# Patient Record
Sex: FEMALE | Race: WHITE | Employment: UNEMPLOYED | URBAN - METROPOLITAN AREA
[De-identification: names, ages, dates, MRNs, and addresses within clinical notes are randomized per-mention and may not be internally consistent; named-entity substitution may affect disease eponyms.]

---

## 2022-01-06 ENCOUNTER — APPOINTMENT (OUTPATIENT)
Dept: GENERAL RADIOLOGY | Age: 27
End: 2022-01-06
Payer: COMMERCIAL

## 2022-01-06 ENCOUNTER — HOSPITAL ENCOUNTER (EMERGENCY)
Age: 27
Discharge: HOME | End: 2022-01-06
Payer: COMMERCIAL

## 2022-01-06 VITALS
SYSTOLIC BLOOD PRESSURE: 117 MMHG | DIASTOLIC BLOOD PRESSURE: 74 MMHG | OXYGEN SATURATION: 100 % | WEIGHT: 135 LBS | HEART RATE: 102 BPM | TEMPERATURE: 97.1 F | RESPIRATION RATE: 15 BRPM

## 2022-01-06 DIAGNOSIS — R00.0 TACHYCARDIA: Primary | ICD-10-CM

## 2022-01-06 LAB
A/G RATIO: 1.8 (ref 1.1–2.2)
ALBUMIN SERPL-MCNC: 4.6 G/DL (ref 3.4–5)
ALP BLD-CCNC: 37 U/L (ref 40–129)
ALT SERPL-CCNC: 14 U/L (ref 10–40)
ANION GAP SERPL CALCULATED.3IONS-SCNC: 12 MMOL/L (ref 3–16)
AST SERPL-CCNC: 12 U/L (ref 15–37)
BASOPHILS ABSOLUTE: 0.1 K/UL (ref 0–0.2)
BASOPHILS RELATIVE PERCENT: 0.4 %
BILIRUB SERPL-MCNC: 0.3 MG/DL (ref 0–1)
BUN BLDV-MCNC: 8 MG/DL (ref 7–20)
CALCIUM SERPL-MCNC: 8.8 MG/DL (ref 8.3–10.6)
CHLORIDE BLD-SCNC: 102 MMOL/L (ref 99–110)
CO2: 23 MMOL/L (ref 21–32)
CREAT SERPL-MCNC: 0.6 MG/DL (ref 0.6–1.1)
EKG ATRIAL RATE: 108 BPM
EKG ATRIAL RATE: 113 BPM
EKG DIAGNOSIS: NORMAL
EKG DIAGNOSIS: NORMAL
EKG P AXIS: 47 DEGREES
EKG P AXIS: 61 DEGREES
EKG P-R INTERVAL: 142 MS
EKG P-R INTERVAL: 144 MS
EKG Q-T INTERVAL: 324 MS
EKG Q-T INTERVAL: 352 MS
EKG QRS DURATION: 82 MS
EKG QRS DURATION: 82 MS
EKG QTC CALCULATION (BAZETT): 444 MS
EKG QTC CALCULATION (BAZETT): 471 MS
EKG R AXIS: 43 DEGREES
EKG R AXIS: 45 DEGREES
EKG T AXIS: 18 DEGREES
EKG T AXIS: 82 DEGREES
EKG VENTRICULAR RATE: 108 BPM
EKG VENTRICULAR RATE: 113 BPM
EOSINOPHILS ABSOLUTE: 0.1 K/UL (ref 0–0.6)
EOSINOPHILS RELATIVE PERCENT: 0.4 %
GFR AFRICAN AMERICAN: >60
GFR NON-AFRICAN AMERICAN: >60
GLUCOSE BLD-MCNC: 127 MG/DL (ref 70–99)
HCT VFR BLD CALC: 45.3 % (ref 36–48)
HEMOGLOBIN: 15.4 G/DL (ref 12–16)
LYMPHOCYTES ABSOLUTE: 1.3 K/UL (ref 1–5.1)
LYMPHOCYTES RELATIVE PERCENT: 10.7 %
MCH RBC QN AUTO: 30 PG (ref 26–34)
MCHC RBC AUTO-ENTMCNC: 34.1 G/DL (ref 31–36)
MCV RBC AUTO: 87.9 FL (ref 80–100)
MONOCYTES ABSOLUTE: 0.6 K/UL (ref 0–1.3)
MONOCYTES RELATIVE PERCENT: 4.7 %
NEUTROPHILS ABSOLUTE: 10.2 K/UL (ref 1.7–7.7)
NEUTROPHILS RELATIVE PERCENT: 83.8 %
PDW BLD-RTO: 12.6 % (ref 12.4–15.4)
PLATELET # BLD: 258 K/UL (ref 135–450)
PMV BLD AUTO: 8.3 FL (ref 5–10.5)
POTASSIUM REFLEX MAGNESIUM: 3.7 MMOL/L (ref 3.5–5.1)
RBC # BLD: 5.16 M/UL (ref 4–5.2)
SODIUM BLD-SCNC: 137 MMOL/L (ref 136–145)
TOTAL PROTEIN: 7.1 G/DL (ref 6.4–8.2)
TROPONIN: <0.01 NG/ML
TSH REFLEX: 0.35 UIU/ML (ref 0.27–4.2)
WBC # BLD: 12.2 K/UL (ref 4–11)

## 2022-01-06 PROCEDURE — 99284 EMERGENCY DEPT VISIT MOD MDM: CPT

## 2022-01-06 PROCEDURE — 2580000003 HC RX 258: Performed by: PHYSICIAN ASSISTANT

## 2022-01-06 PROCEDURE — 93005 ELECTROCARDIOGRAM TRACING: CPT | Performed by: EMERGENCY MEDICINE

## 2022-01-06 PROCEDURE — 84443 ASSAY THYROID STIM HORMONE: CPT

## 2022-01-06 PROCEDURE — 84484 ASSAY OF TROPONIN QUANT: CPT

## 2022-01-06 PROCEDURE — 6370000000 HC RX 637 (ALT 250 FOR IP): Performed by: PHYSICIAN ASSISTANT

## 2022-01-06 PROCEDURE — 93010 ELECTROCARDIOGRAM REPORT: CPT | Performed by: INTERNAL MEDICINE

## 2022-01-06 PROCEDURE — 85025 COMPLETE CBC W/AUTO DIFF WBC: CPT

## 2022-01-06 PROCEDURE — 80053 COMPREHEN METABOLIC PANEL: CPT

## 2022-01-06 PROCEDURE — 71045 X-RAY EXAM CHEST 1 VIEW: CPT

## 2022-01-06 RX ORDER — 0.9 % SODIUM CHLORIDE 0.9 %
1000 INTRAVENOUS SOLUTION INTRAVENOUS ONCE
Status: COMPLETED | OUTPATIENT
Start: 2022-01-06 | End: 2022-01-06

## 2022-01-06 RX ORDER — HYDROXYZINE PAMOATE 25 MG/1
25 CAPSULE ORAL 3 TIMES DAILY PRN
Qty: 90 CAPSULE | Refills: 0 | Status: SHIPPED | OUTPATIENT
Start: 2022-01-06 | End: 2022-02-05

## 2022-01-06 RX ORDER — LORAZEPAM 1 MG/1
1 TABLET ORAL ONCE
Status: COMPLETED | OUTPATIENT
Start: 2022-01-06 | End: 2022-01-06

## 2022-01-06 RX ADMIN — LORAZEPAM 1 MG: 1 TABLET ORAL at 11:40

## 2022-01-06 RX ADMIN — SODIUM CHLORIDE 1000 ML: 9 INJECTION, SOLUTION INTRAVENOUS at 11:41

## 2022-01-06 ASSESSMENT — PAIN SCALES - GENERAL
PAINLEVEL_OUTOF10: 0
PAINLEVEL_OUTOF10: 0

## 2022-01-06 NOTE — ED NOTES
Xray at [facility], [facility]  01/06/22 4169

## 2022-01-06 NOTE — ED PROVIDER NOTES
I did not personally evaluate this patient but I was asked to review the EKG. EKG  The Ekg interpreted by myself in the emergency department in the absence of a cardiologist.  sinus tachycardia, sdpl=775 with a rate of 113  Axis is   Normal  QTc is  within an acceptable range  Intervals and Durations are unremarkable. No specific ST-T wave changes appreciated. No evidence of acute ischemia.    No prior EKG available for comparison     Raquel Abarca MD  01/06/22 2311

## 2022-01-06 NOTE — ED PROVIDER NOTES
Running Out of Food in the Last Year: Not on file    Ran Out of Food in the Last Year: Not on file   Transportation Needs:     Lack of Transportation (Medical): Not on file    Lack of Transportation (Non-Medical): Not on file   Physical Activity:     Days of Exercise per Week: Not on file    Minutes of Exercise per Session: Not on file   Stress:     Feeling of Stress : Not on file   Social Connections:     Frequency of Communication with Friends and Family: Not on file    Frequency of Social Gatherings with Friends and Family: Not on file    Attends [employer] Services: Not on file    Active Member of [address] [place] [address] [employer] or Organizations: Not on file    Attends Club or Organization Meetings: Not on file    Marital Status: Not on file   Intimate Partner Violence:     Fear of Current or Ex-Partner: Not on file    Emotionally Abused: Not on file    Physically Abused: Not on file    Sexually Abused: Not on file   Housing Stability:     Unable to Pay for Housing in the Last Year: Not on file    Number of [place] in the Last Year: Not on file    Unstable Housing in the Last Year: Not on file     No current facility-administered medications for this encounter. Current Outpatient Medications   Medication Sig Dispense Refill    hydrOXYzine (VISTARIL) 25 MG capsule Take 1 capsule by mouth 3 times daily as needed for Itching 90 capsule 0     No Known Allergies    REVIEW OF SYSTEMS  10 systems reviewed, pertinent positives per HPI otherwise noted to be negative    PHYSICAL EXAM  /74   Pulse 102   Temp 97.1 °F (36.2 °C) (Oral)   Resp 15   Wt 135 lb (61.2 kg)   SpO2 100%   GENERAL APPEARANCE: Awake and alert. Cooperative. Anxious, tearful  HEAD: Normocephalic. Atraumatic. EYES: EOM's grossly intact. ENT: Mucous membranes are moist.   NECK: Supple. HEART: Tachycardic, regular rhythm no murmur. LUNGS: Respirations unlabored. CTAB. Good air exchange. Speaking comfortably in full sentences.    ABDOMEN: Soft. Non-distended. Non-tender. No guarding or rebound. No masses. No organomegaly. EXTREMITIES: No peripheral edema. Moves all extremities equally. All extremities neurovascularly intact. No calf tenderness or unilateral leg swelling. SKIN: Warm and dry. No acute rashes. NEUROLOGICAL: Alert and oriented. CN's 2-12 intact. No gross facial drooping. Strength 5/5, sensation intact. PSYCHIATRIC: Normal mood and affect. RADIOLOGY  XR CHEST PORTABLE   Final Result   No acute process. LABS  Labs Reviewed   CBC WITH AUTO DIFFERENTIAL - Abnormal; Notable for the following components:       Result Value    WBC 12.2 (*)     Neutrophils Absolute 10.2 (*)     All other components within normal limits    Narrative:     Performed at:  [address], [facility]   Phone [phone]   COMPREHENSIVE METABOLIC PANEL W/ REFLEX TO MG FOR LOW K - Abnormal; Notable for the following components:    Glucose 127 (*)     Alkaline Phosphatase 37 (*)     AST 12 (*)     All other components within normal limits    Narrative:     Performed at:  [facility]) [address], [facility]   Phone [phone]   TROPONIN    Narrative:     Performed at:  [address], [facility]   Phone [phone]   TSH WITH REFLEX       PROCEDURES  Unless otherwise noted below, none  Procedures    ED COURSE    ED Course as of 01/06/22 1606   Thu Jan 06, 2022   1312 Reevaluation patient had resolution of her tachycardia and was feeling much better. We will plan discharge patient home with cardiology as well as primary care follow-up. Patient continues to deny chest pain pleuritic chest pain difficulty breathing.  [MM]      ED Course User Index  [MM] Shimon Mcgowan PA-C       CRITICAL CARE TIME  The total critical care time spent while evaluating and treating this patient was 0 minutes. This excludes time spent doing separately billable procedures. This includes time at the bedside, data interpretation, medication management, obtaining critical history from collateral sources if the patient is unable to provide it directly, and physician consultation. Specifics of interventions taken and potentially life-threatening diagnostic considerations are listed above in the medical decision making. MDM  MDM  77-year-old female presents the ED for evaluation of elevated heart rate. Symptoms have been ongoing for the past week. She reports a lot of new stressors in her life including difficulties at home and illnesses within the family. She reports her grandmother is in the ICU as of today. Arrival to ED patient was tachycardic at a rate of 115 and very anxious and tearful in appearance. She admits to being very anxious with home stressors. She had no chest pain, difficulty breathing or pleuritic chest pain. No history of blood clots. No risk factors for pulmonary embolism. Lab work was obtained which demonstrates no electrolyte abnormalities no significant leukocytosis troponin undetected. Please refer to attending note for EKG interpretation. Chest x-ray was clear. Patient was given a liter of fluid as well as a dose of antianxiety medication. On reevaluation she had resolution of her tachycardia as well as improvement of her symptoms. Patient states she is feeling better and does feel comfortable for discharge home. We will plan discharge patient home. I did provide her with a referral for primary care as she requested as well as referral for cardiology for evaluation of the tachycardia. Discussed with her the possible use of monitors in the future such as Holter or event monitors. Confirmed with the patient again that she is not having any chest pain pleuritic chest pain difficulty breathing. She states she is not having any of the symptoms.   We will plan discharge patient home on a course of Vistaril. This time however estimate low risk for ACS or pulmonary embolism. DISPOSITION  Patient was discharged to home in good condition. CLINICAL IMPRESSION  1.  Tachycardia            Roxann Cunningham PA-C  01/06/22 0420

## 2022-01-10 ENCOUNTER — TELEPHONE (OUTPATIENT)
Dept: CARDIOLOGY CLINIC | Age: 27
End: 2022-01-10

## 2022-01-10 ENCOUNTER — HOSPITAL ENCOUNTER (OUTPATIENT)
Age: 27
Discharge: HOME | End: 2022-01-10
Payer: COMMERCIAL

## 2022-01-10 ENCOUNTER — OFFICE VISIT (OUTPATIENT)
Dept: CARDIOLOGY CLINIC | Age: 27
End: 2022-01-10
Payer: COMMERCIAL

## 2022-01-10 VITALS
BODY MASS INDEX: 27 KG/M2 | SYSTOLIC BLOOD PRESSURE: 108 MMHG | OXYGEN SATURATION: 99 % | WEIGHT: 143 LBS | DIASTOLIC BLOOD PRESSURE: 74 MMHG | HEIGHT: 61 IN | HEART RATE: 125 BPM

## 2022-01-10 DIAGNOSIS — R00.0 TACHYCARDIA: ICD-10-CM

## 2022-01-10 DIAGNOSIS — R00.2 PALPITATIONS: ICD-10-CM

## 2022-01-10 LAB
AMPHETAMINE SCREEN, URINE: NORMAL
BARBITURATE SCREEN URINE: NORMAL
BASOPHILS ABSOLUTE: 0 K/UL (ref 0–0.2)
BASOPHILS RELATIVE PERCENT: 0.3 %
BENZODIAZEPINE SCREEN, URINE: NORMAL
CANNABINOID SCREEN URINE: NORMAL
COCAINE METABOLITE SCREEN URINE: NORMAL
D DIMER: 289 NG/ML DDU (ref 0–229)
EOSINOPHILS ABSOLUTE: 0 K/UL (ref 0–0.6)
EOSINOPHILS RELATIVE PERCENT: 0.3 %
GONADOTROPIN, CHORIONIC (HCG) QUANT: <5 MIU/ML
HCT VFR BLD CALC: 47.3 % (ref 36–48)
HEMOGLOBIN: 16.2 G/DL (ref 12–16)
LYMPHOCYTES ABSOLUTE: 1.5 K/UL (ref 1–5.1)
LYMPHOCYTES RELATIVE PERCENT: 11.6 %
Lab: NORMAL
MAGNESIUM: 2 MG/DL (ref 1.8–2.4)
MCH RBC QN AUTO: 30.2 PG (ref 26–34)
MCHC RBC AUTO-ENTMCNC: 34.3 G/DL (ref 31–36)
MCV RBC AUTO: 88.2 FL (ref 80–100)
METHADONE SCREEN, URINE: NORMAL
MONOCYTES ABSOLUTE: 0.5 K/UL (ref 0–1.3)
MONOCYTES RELATIVE PERCENT: 4 %
NEUTROPHILS ABSOLUTE: 10.5 K/UL (ref 1.7–7.7)
NEUTROPHILS RELATIVE PERCENT: 83.8 %
OPIATE SCREEN URINE: NORMAL
OXYCODONE URINE: NORMAL
PDW BLD-RTO: 12.7 % (ref 12.4–15.4)
PH UA: 7
PHENCYCLIDINE SCREEN URINE: NORMAL
PLATELET # BLD: 263 K/UL (ref 135–450)
PMV BLD AUTO: 8.5 FL (ref 5–10.5)
POTASSIUM SERPL-SCNC: 4.1 MMOL/L (ref 3.5–5.1)
PROPOXYPHENE SCREEN: NORMAL
RBC # BLD: 5.37 M/UL (ref 4–5.2)
WBC # BLD: 12.5 K/UL (ref 4–11)

## 2022-01-10 PROCEDURE — G8419 CALC BMI OUT NRM PARAM NOF/U: HCPCS | Performed by: INTERNAL MEDICINE

## 2022-01-10 PROCEDURE — 1036F TOBACCO NON-USER: CPT | Performed by: INTERNAL MEDICINE

## 2022-01-10 PROCEDURE — 84702 CHORIONIC GONADOTROPIN TEST: CPT

## 2022-01-10 PROCEDURE — 83735 ASSAY OF MAGNESIUM: CPT

## 2022-01-10 PROCEDURE — 36415 COLL VENOUS BLD VENIPUNCTURE: CPT

## 2022-01-10 PROCEDURE — G8427 DOCREV CUR MEDS BY ELIG CLIN: HCPCS | Performed by: INTERNAL MEDICINE

## 2022-01-10 PROCEDURE — 80307 DRUG TEST PRSMV CHEM ANLYZR: CPT

## 2022-01-10 PROCEDURE — G8484 FLU IMMUNIZE NO ADMIN: HCPCS | Performed by: INTERNAL MEDICINE

## 2022-01-10 PROCEDURE — 85379 FIBRIN DEGRADATION QUANT: CPT

## 2022-01-10 PROCEDURE — 84132 ASSAY OF SERUM POTASSIUM: CPT

## 2022-01-10 PROCEDURE — 93228 REMOTE 30 DAY ECG REV/REPORT: CPT | Performed by: INTERNAL MEDICINE

## 2022-01-10 PROCEDURE — 99204 OFFICE O/P NEW MOD 45 MIN: CPT | Performed by: INTERNAL MEDICINE

## 2022-01-10 PROCEDURE — 86038 ANTINUCLEAR ANTIBODIES: CPT

## 2022-01-10 PROCEDURE — 85025 COMPLETE CBC W/AUTO DIFF WBC: CPT

## 2022-01-10 NOTE — PATIENT INSTRUCTIONS
Plan   1. Cardiac event monitor to evaluate heart rate and rhythm. 2. Lab testing to evaluate possible causes of tachycardia. 3. Echocardiogram to evaluate heart structure and function. Your provider has ordered testing for further evaluation. An order/prescription has been included in your paper work.  To schedule outpatient testing, contact Central Scheduling by calling [address] [place] [address] ([phone])    4. Follow up with your primary care provider concerning anxiety management and nose bleeds. We will call you following these results.

## 2022-01-10 NOTE — PROGRESS NOTES
CARDIOLOGY CONSULTATION        Patient Name: Francy Frank  Primary Care physician: No primary care provider on file. Reason for Referral/Chief Complaint: Francy Frank is a 32 y.o. patient who is referred to cardiology clinic today for evaluation and treatment of tachycardia. History of Present Illness:   Francy Frank 32 y.o. female with prior medical history notable for anxiety, who presents today for evaluation for palpitations and tachycardia. Patient was recently evaluated 1/6/2022 in the emergency department for her complaints. EKG showed sinus tachycardia with nonspecific ST-T wave changes. Chest x-ray was normal.  Labs notable for negative troponin, mildly elevated white count and TSH within normal limits. No anemia. Today she reports she has had elevated heart rate and palpitations for nearly 2 years. Notes her symptoms worsened around [place] time and they were so bad that she sought treatment in the emergency department recently as above. At that time she felt nauseous and lightheaded, severe. Feels heart racing along with lightheadedness. No syncope. Does feel as though her cat is sitting on her chest at times when her heart is racing fast.  No yvonne pain. Notes her symptoms may be worse with anxiety, moving about and caring for her children as well as hot shower. She states symptoms may be made better with a cold shower. She bought a new apple watch, notes heart rates 120s often. Appropriately in the 60s at night. Sometimes notes heart rate 80s at rest when she is calm. Drinks plenty of fluids. Now avoids caffeine since November. No significant alcohol use. Denies illicit drug use. States she has IUD in place that was checked over the summer, 3years old, no reasonable if she is pregnant. No history of venous thromboembolism. Due to see Dr. Crystal Alcantar as new PCP appointment later this month.       She has been under stress with the holidays, her father is currently undergoing chemo and her grandmother is in the ICU with PNA. She has a very busy life caring her her 6year old and 1year old. Recently started on hydroxyzine for anxiety by emergency department, she is not so sure this is working well. Makes her feel tired. Denies paroxysmal nocturnal dyspnea, orthopnea, bendopnea, increasing lower extremity edema or weight gain. Review of systems notable for occasional reflux. No neck pain/swelling. Nearly daily nosebleeds without anemia. No other source of bleeding. No myalgias, arthralgias or arthritis history. No history of rash. Past Medical History:  Anxiety    Surgical History:  Denies prior surgical history    Social History:   reports that she has never smoked. She has never used smokeless tobacco. She reports previous alcohol use. She reports previous drug use. Lives at home with her fiancé and 2 children. 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 Family History:  family history is not on file. Father- heart attacks (multiple), unsure what age  Mother with history of thyroid trouble  Half-brother does have history of lupus. She reports no other arthritides or inflammatory disorders in the family. Home Medications:  Were reviewed and are listed in nursing record and/or below  Prior to Admission medications    Medication Sig Start Date End Date Taking? Authorizing Provider   hydrOXYzine (VISTARIL) 25 MG capsule Take 1 capsule by mouth 3 times daily as needed for Itching 1/6/22 2/5/22  Shey Sanchez PA-C        CURRENT Medications:  No current facility-administered medications for this visit. Allergies:  Patient has no known allergies. Review of Systems:   A 14 point review of symptoms completed. Pertinent positives identified in the HPI, all other review of symptoms negative as below.       Objective:     Vitals:    01/10/22 0856 01/10/22 0859 01/10/22 0901   BP: 114/78 112/78 108/74   Position: Supine Sitting Standing   Pulse: 122 118 125   SpO2: 99% 99% 99%   Weight: 143 lb (64.9 kg)     Height: 5' 1\" (1.549 m)        Weight: 143 lb (64.9 kg)       Orthostatics negative    PHYSICAL EXAM:    General:  Alert, cooperative, mildly anxious appearing, appears stated age   Head:  Normocephalic, atraumatic   Eyes:  Conjunctiva/corneas clear, anicteric sclerae    Nose: Nares normal, no drainage or sinus tenderness   Throat: No abnormalities of the lips, oral mucosa or tongue. Moist mucous membranes   Neck: Trachea midline. Neck supple with no lymphadenopathy, thyroid not enlarged, symmetric, no tenderness/mass/nodules, no Jugular venous pressure elevation    Lungs:   Clear to auscultation bilaterally, no wheezes, no rales, no respiratory distress   Chest Wall:  No deformity or tenderness to palpation   Heart:  Regular rhythm, tachycardic, normal S1, normal S2, no murmur, no rub, no S3/S4, PMI non-displaced. No heave   Abdomen:   Soft, non-tender, with normoactive bowel sounds. No masses, no hepatosplenomegaly   Extremities: No cyanosis, clubbing or pitting edema. Vascular: 2+ radial, 2+ dorsalis pedis and posterior tibial pulses bilaterally. Brisk carotid upstrokes without carotid bruit. Skin: Skin color, texture, turgor are normal with no rashes or ulceration. Pysch: Euthymic mood, appropriate affect   Neurologic: Oriented to person, place and time. No slurred speech or facial asymmetry. No motor or sensory deficits on gross examination.          Labs:   CBC:   Lab Results   Component Value Date    WBC 12.2 01/06/2022    RBC 5.16 01/06/2022    HGB 15.4 01/06/2022    HCT 45.3 01/06/2022    MCV 87.9 01/06/2022    RDW 12.6 01/06/2022     01/06/2022     CMP:  Lab Results   Component Value Date     01/06/2022    K 3.7 01/06/2022     01/06/2022    CO2 23 01/06/2022    BUN 8 01/06/2022    CREATININE 0.6 01/06/2022    GFRAA >60 01/06/2022    AGRATIO 1.8 01/06/2022    LABGLOM >60 01/06/2022    GLUCOSE 127 01/06/2022    PROT 7.1 01/06/2022    CALCIUM 8.8 01/06/2022    BILITOT 0.3 01/06/2022    ALKPHOS 37 01/06/2022    AST 12 01/06/2022    ALT 14 01/06/2022     PT/INR:  No results found for: PTINR  HgBA1c:No results found for: LABA1C  Lab Results   Component Value Date    TROPONINI <0.01 01/06/2022         Cardiac Data:     EKG 1/6/22: Personally reviewed with my interpretation: Sinus tachycardia with nonspecific ST and T wave abnormalities. Improved on repeat. Additional studies:     Chest Xray 1/6/22:  FINDINGS: The lungs are without acute focal process. There is no effusion or pneumothorax. The cardiomediastinal silhouette is without acute process. The osseous structures are without acute process. Impression and Plan:      1. Sinus tachycardia-possible diagnosis inappropriate sinus tachycardia  2. Palpitations  3. Abnormal EKG   4. Anxiety, suboptimally managed  5. Mild leukocytosis by lab work 1/6      Patient Active Problem List   Diagnosis    Tachycardia    Palpitations       PLAN:  1. We will obtain cardiac event monitor study to evaluate heart rate and rhythm. Plan vital connect monitor x1 week as she has daily symptoms. 2.  Repeat CBC with differential, repeat potassium and magnesium, add SHANNON given family history, pregnancy test, urine drug screen, and D-dimer. 3. Will obtain transthoracic echocardiogram for evaluation of underlying cardiac structure and function. 4. Follow up with your primary care provider/Dr. Kimbrough as planned 1/24 Concerning anxiety management and nose bleeds. Will call following these results. Follow-up plan pending results -will rule out contributing factors to tachycardia and do feel that her anxiety needs optimized management. Consider magnesium supplementation if level low. Consider electrophysiology if diagnosis of inappropriate sinus tachycardia seems likely pending further work-up. Scribe's attestation:   This note was scribed in the presence of Dr. Sathya Ridley M.D. By Mike Sun RN    The scribes documentation has been prepared under my direction and personally reviewed by me in its entirety. I confirm that the note above accurately reflects all work, treatment, procedures, and medical decision making performed by me. Dereje Camacho MD, personally performed the services described in this documentation as scribed by Mike Sun RN in my presence, and it is both accurate and complete to the best of our ability. I will address the patient's cardiac risk factors and adjusted pharmacologic treatment as needed. In addition, I have reinforced the need for patient directed risk factor modification. All questions and concerns were addressed to the patient/family. Alternatives to my treatment were discussed. Thank you for allowing us to participate in the care of Izaiah Pappas. Please call me with any questions [phone].     Cirilo Jennings MD, [address] [facility]  [phone] [facility]  [phone] [address] [place] [address]  1/10/2022 9:13 AM

## 2022-01-10 NOTE — TELEPHONE ENCOUNTER
Monitor placed by Yina Penn, [address] V[facility] Connect   Length of monitor 7 days   Monitor ordered by [facility]   Monitor Number [id number]    Activation successful prior to pt leaving office?  Yes

## 2022-01-11 ENCOUNTER — TELEPHONE (OUTPATIENT)
Dept: CARDIOLOGY | Age: 27
End: 2022-01-11

## 2022-01-11 DIAGNOSIS — R79.89 ELEVATED D-DIMER: ICD-10-CM

## 2022-01-11 DIAGNOSIS — I26.99 PULMONARY EMBOLISM, OTHER, UNSPECIFIED CHRONICITY, UNSPECIFIED WHETHER ACUTE COR PULMONALE PRESENT (HCC): Primary | ICD-10-CM

## 2022-01-11 DIAGNOSIS — R07.89 CHEST PRESSURE: ICD-10-CM

## 2022-01-11 LAB — ANTI-NUCLEAR ANTIBODY (ANA): NEGATIVE

## 2022-01-11 NOTE — TELEPHONE ENCOUNTER
Please let the patient know - labs reviewed. Persistent mild leukocytosis. Low-intermediate risk well's, d-dimer elevated, with other labs reassuring. Given d-dimer positive, chest discomfort/palpitations, recommend CT PE protocol to rule out pulmonary embolism. RN: CT chest with contrast for pulmonary embolism if patient agreeable. Let me know if questions.     Itz Tubbs MD, [address] [facility]  [phone] [facility]  [phone] [address] [place] [address]

## 2022-01-11 NOTE — TELEPHONE ENCOUNTER
Spoke with pt and relayed results. Pt VU and agreeable to CT scan. Scan ordered, and pt provided with scheduling number.

## 2022-01-13 ENCOUNTER — TELEPHONE (OUTPATIENT)
Dept: CARDIOLOGY CLINIC | Age: 27
End: 2022-01-13

## 2022-01-13 DIAGNOSIS — Z91.89 AT HIGH RISK FOR PULMONARY EMBOLISM: Primary | ICD-10-CM

## 2022-01-13 NOTE — TELEPHONE ENCOUNTER
Pt's CT chest order needs to be changed to :CT CHEST PULMONARY EMBOLISM WITH CONTRAST\". No one needs to be called back. Please change order prior to 1/18/22.

## 2022-01-14 ENCOUNTER — TELEPHONE (OUTPATIENT)
Dept: PRIMARY CARE CLINIC | Age: 27
End: 2022-01-14

## 2022-01-14 ENCOUNTER — TELEPHONE (OUTPATIENT)
Dept: CARDIOLOGY CLINIC | Age: 27
End: 2022-01-14

## 2022-01-14 PROBLEM — F41.9 ANXIETY: Status: ACTIVE | Noted: 2022-01-14

## 2022-01-14 NOTE — TELEPHONE ENCOUNTER
Patient concerned -  Her HR agustin been >100 since last PM. She states she is very \"shaky\". She is upset/worried and asking to speak with an RN.  TY

## 2022-01-14 NOTE — TELEPHONE ENCOUNTER
Spoke with pt, symptoms resolved after resting all day. She was able to get in with her PCP for an earlier appointment this coming Wednesday regarding her anxiety, and has her CT chest scheduled for Thursday.

## 2022-01-14 NOTE — PROGRESS NOTES
[address] [facility]  [address] [facility], [facility], [address] [place] [address]  Phone: [phone]      Name:  Los Soliman  :    1995    Consultants:   Patient Care Team:  Kameron Leary DO as PCP - General (Family Medicine)    Chief Complaint:     Los Soliman is a 32 y.o. female who presents today for ER f/u and a New Patient care visit with Personalized Prevention Plan Services as noted below. HPI:     ER follow-up for tachycardia:  2022 ER visit for tachycardia as evidenced by new smartwatch. Has always noticed elevated HR and palpitations for 2-3 years. HR 60s at night. Associated nausea and lightheadedness. Workup negative except a mildly elevated WBC 12.5. Was given 1L IVF and antianxiety meds, and d/c on hydroxyzine 25 mg.     1/10/2022 Cardiology visit: Noted slightly elevated WBC 12.5. SHANNON neg, UPT neg, UDS neg. D-dimer 289 (mildly elevated). TSH 0.35. Echo showed EF >65% without structural abnormalities. Instructed to wear cardiac monitor x1 wk. Pt just turned in monitor information. CTPE today negative for PE. Today:   Pt states she still feels palpitations with some associated lightheadedness. She took hydroxyzine scheduled every 6 hours and noticed her anxiety levels and palpitation symptoms improved for the first 3 hours, but she would get very drowsy. At times she would be upset that she was so sleepy and unable to get enough work done. When her anxiety worsens, she notices her HR increasing associated with mild lightheadedness and SOB. Denies any chest pain, nausea, vomiting, diarrhea or other symptoms. Pt states that her anxiety is primarily due to several stressors. Her father is going through chemotherapy for stage 4 metastatic cancer involving multiple organs. She just placed her grandma in the nursing home d/t being unable to take care of her sufficiently at home with her 3 children (ages 1, 11, 5 yo).  She is in the process of adopting her  sister's daughter. Her sister overdosed on heroin over 4 years ago. She previously was a . 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 The schools switch from in person/virtual and her kids are constantly with some mild symptoms that require them to quarantine at home. Patient's boyfriend takes Sertraline (Zoloft) for his anxiety, and patient was wondering if an increase in Hydroxyzine or a different medication such as Sertraline would be more helpful. Patient Active Problem List   Diagnosis    Tachycardia    Palpitations    Anxiety    IUD (intrauterine device) in place    Idiopathic scoliosis       Past Medical History:    Past Medical History:   Diagnosis Date    Anxiety     Scoliosis        Past Surgical History:  No past surgical history on file. Home Meds:  Prior to Visit Medications    Medication Sig Taking? Authorizing Provider   levonorgestrel (MIRENA) IUD 52 mg 1 each by IntraUTERine route Yes Historical Provider, MD   Multiple Vitamin (MULTIVITAMIN ADULT PO) Take 1 capsule by mouth daily Yes Historical Provider, MD   sertraline (ZOLOFT) 50 MG tablet Take 25 mg daily for 1 wk, then start taking 50 mg daily. Yes Eduardo Whitley,    hydrOXYzine (VISTARIL) 25 MG capsule Take 1 capsule by mouth 3 times daily as needed for Itching Yes Tevin Iniguez PA-C       Allergies:    Patient has no known allergies.     Family History:       Problem Relation Age of Onset    Thyroid Disease Mother     Heart Attack Father     Lupus Half-Brother     Mult Sclerosis Half-Brother     Cancer Maternal Grandmother         tongue    Breast Cancer Paternal Grandmother        Social History:  Social History     Tobacco History     Smoking Status  Never Smoker    Smokeless Tobacco Use  Never Used          Alcohol History     Alcohol Use Status  Not Currently          Drug Use     Drug Use Status  Not Currently Sexual Activity     Sexually Active  Not Asked                   Health Maintenance Completed:  Health Maintenance   Topic Date Due    Hepatitis C screen  Never done    Varicella vaccine (2 of 2 - 2-dose childhood series) 11/02/1999    HIV screen  Never done    Pap smear  Never done    Flu vaccine (1) 09/01/2021    COVID-19 Vaccine (3 - Booster for Pfizer series) 02/21/2022    Depression Screen  01/19/2023    DTaP/Tdap/Td vaccine (5 - Td or Tdap) 08/21/2023    Hepatitis B vaccine  Completed    HPV vaccine  Completed    Hepatitis A vaccine  Aged Out    Hib vaccine  Aged Out    Meningococcal (ACWY) vaccine  Aged Out    Pneumococcal 0-64 years Vaccine  Aged [employer] History   Administered Date(s) Administered    COVID-19, Pfizer Purple top, DILUTE for use, 12+ yrs, 30mcg/0.3mL dose 07/31/2021, 08/21/2021    DTaP (Infanrix) 1995, 03/04/1996, 05/24/1996, 11/04/1996, 11/03/1999    HPV 9-valent Joanne Delay) 07/25/2018, 09/27/2018, 02/07/2019    Hepatitis B 1995, 1995, 05/24/1996    Influenza Virus Vaccine 11/17/2011    MMR 11/04/1996, 11/30/1999    Meningococcal MCV4P (Menactra) 08/22/2007    Polio IPV (IPOL) 1995, 03/04/1996, 05/24/1996, 11/30/1999    Tdap (Boostrix, Adacel) 08/22/2007, 08/21/2013    Varicella (Varivax) 11/04/1996         Review of Systems:  Review of Systems   Constitutional: Negative for chills, fatigue and fever. HENT: Positive for nosebleeds (daily, every morning). Respiratory: Positive for shortness of breath (associated with episodes of anxiety). Negative for cough. Cardiovascular: Positive for palpitations. Negative for chest pain. Gastrointestinal: Negative for abdominal pain, diarrhea, nausea and vomiting. Genitourinary: Negative for dysuria. Neurological: Positive for light-headedness (associated with episodes of anxiety). Negative for headaches. Psychiatric/Behavioral: Positive for sleep disturbance.  The Never done    Pap smear  Never done    Flu vaccine (1) 09/01/2021        Health care decision maker:  <72years old      Health Maintenance: (USPSTF Recommendations)  (F) Cervical Cancer Screen: (21-29 q3yr cytology alone; 30-65 q3yr cytology alone, q5yr with hrHPV alone, or q5yr cytology+hrHPV (A)): HIV Screen: (16-65 yr old, and all pregnant patients (A)): Hep C Screen: (18-79 yr old (B)):  Immunizations: [place] 2-dose completed on 8/21/2021. Tetanus booster on 8/21/2013. RTC:  Return in about 3 weeks (around 2/9/2022) for medication and to establish care. -f/u to establish care  -f/u on Sertraline side effects  -f/u on healthcare maintenance      EMR Dragon/transcription disclaimer:  Much of this encounter note is electronic transcription/translation of spoken language to printed texts. The electronic translation of spoken language may be erroneous, or at times, nonsensical words or phrases may be inadvertently transcribed.   Although I have reviewed the note for such errors, some may still exist.     Darien Gaytan, DO  1/19/2022

## 2022-01-14 NOTE — TELEPHONE ENCOUNTER
Spoke with pt. she reports sustained HR in 100s with fluttering feeling and body shakes. No syncope. Max 139bpm while awake,  60s-70s while sleeping. Reviewed pt rhythm on [employer], No afib or arrhythmias. Pt has taken hydroxyzine 25mg q6hrs, which she states \"helps some\" but still is shaking. Has been up since 4am. She reports she is still eating and drinking, no caffeine, only water.      Please advise TY

## 2022-01-14 NOTE — TELEPHONE ENCOUNTER
Please instruct patient that I will address this concern at our new patient encounter. Thank you.     Radu El DO  1/14/2022

## 2022-01-14 NOTE — TELEPHONE ENCOUNTER
Continue monitor study. In absence of abnormal heart rhythm during the episode, would have her connect with PCP - I wonder if these \"shaking\" episodes are reflective of infection (ie rigors) as she did have leukocytosis per labs, or go along with her anxiety that is not optimally controlled in talking with her prior. I will review strips once in clinic in the next 1 hr.  Ryan CENTENO

## 2022-01-14 NOTE — TELEPHONE ENCOUNTER
PT got  hydrOXYzine (VISTARIL) 25 MG capsule  prescribed while in the hospital.     PT says that her cardiologist said to call her PCP and see if they could raise the dose due to her anxiety making her heart rate high.     PT originally scheduled  1/25 moved her appointment to 1/19.      [phone]

## 2022-01-18 ENCOUNTER — HOSPITAL ENCOUNTER (OUTPATIENT)
Dept: CARDIOLOGY | Age: 27
Discharge: HOME | End: 2022-01-18
Payer: COMMERCIAL

## 2022-01-18 DIAGNOSIS — R00.2 PALPITATIONS: ICD-10-CM

## 2022-01-18 DIAGNOSIS — R00.0 TACHYCARDIA: ICD-10-CM

## 2022-01-18 LAB
LV EF: 65 %
LVEF MODALITY: NORMAL

## 2022-01-18 PROCEDURE — 93306 TTE W/DOPPLER COMPLETE: CPT

## 2022-01-18 SDOH — HEALTH STABILITY: PHYSICAL HEALTH: ON AVERAGE, HOW MANY MINUTES DO YOU ENGAGE IN EXERCISE AT THIS LEVEL?: 30 MIN

## 2022-01-18 SDOH — HEALTH STABILITY: PHYSICAL HEALTH: ON AVERAGE, HOW MANY DAYS PER WEEK DO YOU ENGAGE IN MODERATE TO STRENUOUS EXERCISE (LIKE A BRISK WALK)?: 1 DAY

## 2022-01-19 ENCOUNTER — HOSPITAL ENCOUNTER (OUTPATIENT)
Dept: CT IMAGING | Age: 27
Discharge: HOME | End: 2022-01-19
Payer: COMMERCIAL

## 2022-01-19 ENCOUNTER — OFFICE VISIT (OUTPATIENT)
Dept: PRIMARY CARE CLINIC | Age: 27
End: 2022-01-19
Payer: COMMERCIAL

## 2022-01-19 VITALS
HEIGHT: 61 IN | OXYGEN SATURATION: 98 % | DIASTOLIC BLOOD PRESSURE: 76 MMHG | WEIGHT: 142.2 LBS | TEMPERATURE: 98.3 F | HEART RATE: 82 BPM | SYSTOLIC BLOOD PRESSURE: 118 MMHG | BODY MASS INDEX: 26.85 KG/M2

## 2022-01-19 DIAGNOSIS — R00.0 TACHYCARDIA: ICD-10-CM

## 2022-01-19 DIAGNOSIS — R79.89 ELEVATED D-DIMER: ICD-10-CM

## 2022-01-19 DIAGNOSIS — I26.99 PULMONARY EMBOLISM, OTHER, UNSPECIFIED CHRONICITY, UNSPECIFIED WHETHER ACUTE COR PULMONALE PRESENT (HCC): ICD-10-CM

## 2022-01-19 DIAGNOSIS — R00.2 PALPITATIONS: ICD-10-CM

## 2022-01-19 DIAGNOSIS — R07.89 CHEST PRESSURE: ICD-10-CM

## 2022-01-19 DIAGNOSIS — Z91.89 AT HIGH RISK FOR PULMONARY EMBOLISM: ICD-10-CM

## 2022-01-19 DIAGNOSIS — F41.9 ANXIETY: Primary | ICD-10-CM

## 2022-01-19 PROBLEM — Z97.5 IUD (INTRAUTERINE DEVICE) IN PLACE: Status: ACTIVE | Noted: 2018-07-25

## 2022-01-19 PROBLEM — M41.20 IDIOPATHIC SCOLIOSIS: Status: ACTIVE | Noted: 2022-01-19

## 2022-01-19 PROCEDURE — 6360000004 HC RX CONTRAST MEDICATION: Performed by: INTERNAL MEDICINE

## 2022-01-19 PROCEDURE — G8427 DOCREV CUR MEDS BY ELIG CLIN: HCPCS

## 2022-01-19 PROCEDURE — G8484 FLU IMMUNIZE NO ADMIN: HCPCS

## 2022-01-19 PROCEDURE — 71260 CT THORAX DX C+: CPT

## 2022-01-19 PROCEDURE — G8419 CALC BMI OUT NRM PARAM NOF/U: HCPCS

## 2022-01-19 PROCEDURE — 99204 OFFICE O/P NEW MOD 45 MIN: CPT

## 2022-01-19 PROCEDURE — 1036F TOBACCO NON-USER: CPT

## 2022-01-19 RX ADMIN — IOPAMIDOL 75 ML: 755 INJECTION, SOLUTION INTRAVENOUS at 11:58

## 2022-01-19 SDOH — ECONOMIC STABILITY: FOOD INSECURITY: WITHIN THE PAST 12 MONTHS, THE FOOD YOU BOUGHT JUST DIDN'T LAST AND YOU DIDN'T HAVE MONEY TO GET MORE.: NEVER TRUE

## 2022-01-19 SDOH — ECONOMIC STABILITY: FOOD INSECURITY: WITHIN THE PAST 12 MONTHS, YOU WORRIED THAT YOUR FOOD WOULD RUN OUT BEFORE YOU GOT MONEY TO BUY MORE.: NEVER TRUE

## 2022-01-19 ASSESSMENT — ENCOUNTER SYMPTOMS
VOMITING: 0
COUGH: 0
SHORTNESS OF BREATH: 1
DIARRHEA: 0
ABDOMINAL PAIN: 0
NAUSEA: 0

## 2022-01-19 ASSESSMENT — PATIENT HEALTH QUESTIONNAIRE - PHQ9
6. FEELING BAD ABOUT YOURSELF - OR THAT YOU ARE A FAILURE OR HAVE LET YOURSELF OR YOUR FAMILY DOWN: 0
9. THOUGHTS THAT YOU WOULD BE BETTER OFF DEAD, OR OF HURTING YOURSELF: 0
SUM OF ALL RESPONSES TO PHQ9 QUESTIONS 1 & 2: 1
7. TROUBLE CONCENTRATING ON THINGS, SUCH AS READING THE NEWSPAPER OR WATCHING TELEVISION: 1
SUM OF ALL RESPONSES TO PHQ QUESTIONS 1-9: 7
2. FEELING DOWN, DEPRESSED OR HOPELESS: 0
SUM OF ALL RESPONSES TO PHQ QUESTIONS 1-9: 7
5. POOR APPETITE OR OVEREATING: 1
3. TROUBLE FALLING OR STAYING ASLEEP: 1
1. LITTLE INTEREST OR PLEASURE IN DOING THINGS: 1
8. MOVING OR SPEAKING SO SLOWLY THAT OTHER PEOPLE COULD HAVE NOTICED. OR THE OPPOSITE, BEING SO FIGETY OR RESTLESS THAT YOU HAVE BEEN MOVING AROUND A LOT MORE THAN USUAL: 0
4. FEELING TIRED OR HAVING LITTLE ENERGY: 3
10. IF YOU CHECKED OFF ANY PROBLEMS, HOW DIFFICULT HAVE THESE PROBLEMS MADE IT FOR YOU TO DO YOUR WORK, TAKE CARE OF THINGS AT HOME, OR GET ALONG WITH OTHER PEOPLE: 2

## 2022-01-19 ASSESSMENT — ANXIETY QUESTIONNAIRES
3. WORRYING TOO MUCH ABOUT DIFFERENT THINGS: 2
7. FEELING AFRAID AS IF SOMETHING AWFUL MIGHT HAPPEN: 1
2. NOT BEING ABLE TO STOP OR CONTROL WORRYING: 2
1. FEELING NERVOUS, ANXIOUS, OR ON EDGE: 3
4. TROUBLE RELAXING: 1
GAD7 TOTAL SCORE: 11
6. BECOMING EASILY ANNOYED OR IRRITABLE: 1
IF YOU CHECKED OFF ANY PROBLEMS ON THIS QUESTIONNAIRE, HOW DIFFICULT HAVE THESE PROBLEMS MADE IT FOR YOU TO DO YOUR WORK, TAKE CARE OF THINGS AT HOME, OR GET ALONG WITH OTHER PEOPLE: VERY DIFFICULT
5. BEING SO RESTLESS THAT IT IS HARD TO SIT STILL: 1

## 2022-01-19 ASSESSMENT — SOCIAL DETERMINANTS OF HEALTH (SDOH): HOW HARD IS IT FOR YOU TO PAY FOR THE VERY BASICS LIKE FOOD, HOUSING, MEDICAL CARE, AND HEATING?: HARD

## 2022-01-20 ENCOUNTER — TELEPHONE (OUTPATIENT)
Dept: CARDIOLOGY CLINIC | Age: 27
End: 2022-01-20

## 2022-01-20 NOTE — TELEPHONE ENCOUNTER
Spoke to patient and notified of [facility] results message. SIMRAN. She states she is mailing cardiac event monitor back today.

## 2022-01-20 NOTE — TELEPHONE ENCOUNTER
----- Message from Rene Crowe MD sent at 1/20/2022  7:49 AM EST -----  Pls let the patient know her CT was normal. No evidence of clot in the lung. Will Follow up monitor results.

## 2022-01-20 NOTE — TELEPHONE ENCOUNTER
----- Message from Lawrence Rudolph MD sent at 1/19/2022 10:51 AM EST -----  Please let the patient know the following: Normal echocardiogram showing normal heart strength and no significant valve disease. Follow up monitor results.

## 2022-02-20 ENCOUNTER — TELEPHONE (OUTPATIENT)
Dept: CARDIOLOGY | Age: 27
End: 2022-02-20

## 2022-02-20 NOTE — TELEPHONE ENCOUNTER
Please let the patient know I reviewed her cardiac monitor study. No abnormal heart rhythms found. She did have rapid heartbeat at time of her palpitation symptoms but this was regular sinus tachycardia. Average heart rate was 86 bpm.  Recommend optimizing her anxiety management through PCP/Dr. Kimbrough. Trial of this and watchful waiting. If symptoms worsen we may try magnesium supplementation and consider referral to electrophysiology for possible inappropriate sinus tachycardia. Again, prior to doing this I would recommend anxiety be aggressively managed. Schedule back with me if worsening symptoms in future - PRN.      TARYN

## 2022-02-21 NOTE — TELEPHONE ENCOUNTER
Called pt.  Left message requesting call back to the office to relay monitor results and recommendations per RJM request.

## 2022-02-22 NOTE — PROGRESS NOTES
[address] [facility]  [address] [facility] [address], [address] [place] [address], [address] [place] [address]  Phone: [phone]      Name:  Adrian Miranda  :    1995    Consultants:   Patient Care Team:  Barrie Aparicio DO as PCP - General (Family Medicine)    Chief Complaint:     Adrian Miranda is a 32 y.o. female who presents today for an established patient care visit with Personalized Prevention Plan Services as noted below. HPI:     Adrian Miranda is a 32 y.o. female with a Hx of anxiety with persistent tachycardia who presents today for chronic care follow-up. Anxiety with persistent tachycardia:  2022 ED visit: tachycardia as evidenced by new smartwatch. Had always noticed elevated HR and palpitations for 2-3 yrs. HR 60s at night. Associated nausea and lightheadedness. Workup neg except WBC 12.5. D/c on hydroxyzine 25 mg. 1/10/2022 Cards visit: D-dimer 289 (mildly elevated). TSH 0.35. Echo EF >65% w/o structural abnormalities. CTA neg for PE. Cardiac monitor showed episodes of sinus tachycardia. Hx: Reports several stressors: father undergoing chemotherapy for stage 4 metastatic cancer, grandma in nursing home, having 3 children (ages 2,11,9), and undergoing the process of adopting her  sister's daughter. Sister overdosed on heroin over 4 yrs ago. Previously worked as  but had to quit d/t kids' school situation. Boyfriend takes Sertraline (Zoloft) for anxiety. Today: Patient stated that she started sertraline 25 mg and felt that her anxiety symptoms had decreased. She stopped taking hydroxyzine completely. She noticed that her smart watch has not notified her of tachycardia in over a week (HR >120 for 10 min nonactivity). Side effects that she has noticed it includes getting breakthrough periods that last 2-3 days, 10 days apart. Pt has had an IUD placed for several years, and has not had periods until starting sertraline. Patient's fiancé has also noticed that patient has had no sex drive, so they were wondering if there was a different medication she can try. Other recent history includes children being Covid positive. Eldest son tested positive for Covid on 1/25/2022, patient tested negative for Covid on 1/29/2022. She denies any symptoms although was wondering if her previous labs done on 1/10/2022 predictive of previous Covid infection (elevated D-dimer, elevated WBC). Patient Active Problem List   Diagnosis    Tachycardia    Palpitations    Anxiety    IUD (intrauterine device) in place    Idiopathic scoliosis       Past Medical History:    Past Medical History:   Diagnosis Date    Anxiety     Scoliosis        Past Surgical History:  No past surgical history on file. Home Meds:  Prior to Visit Medications    Medication Sig Taking? Authorizing Provider   buPROPion (WELLBUTRIN XL) 300 MG extended release tablet Take 1 tablet by mouth every morning Yes August Nolasco DO   levonorgestrel (MIRENA) IUD 52 mg 1 each by IntraUTERine route Yes Historical Provider, MD   Multiple Vitamin (MULTIVITAMIN ADULT PO) Take 1 capsule by mouth daily Yes Historical Provider, MD   sertraline (ZOLOFT) 50 MG tablet Take 25 mg daily for 1 wk, then start taking 50 mg daily. Yes August Nolasco DO       Allergies:    Patient has no known allergies.     Family History:       Problem Relation Age of Onset    Thyroid Disease Mother     Heart Attack Father     Lupus Half-Brother     Mult Sclerosis Half-Brother     Cancer Maternal Grandmother         tongue    Breast Cancer Paternal Grandmother        Social History:  Social History     Tobacco History     Smoking Status  Never Smoker    Smokeless Tobacco Use  Never Used          Alcohol History     Alcohol Use Status  Not Currently          Drug Use     Drug Use Status  Not Currently          Sexual Activity     Sexually Active  Not Asked                   Health Maintenance Completed:  Health Maintenance   Topic Date Due    Hepatitis C screen  Never done    Varicella vaccine (2 of 2 - 2-dose childhood series) 11/02/1999    HIV screen  Never done    Pap smear  Never done    Flu vaccine (1) 09/01/2021    COVID-19 Vaccine (3 - Booster for Pfizer series) 01/21/2022    Depression Screen  01/19/2023    DTaP/Tdap/Td vaccine (5 - Td or Tdap) 08/21/2023    Hepatitis B vaccine  Completed    HPV vaccine  Completed    Hepatitis A vaccine  Aged Out    Hib vaccine  Aged Out    Meningococcal (ACWY) vaccine  Aged Out    Pneumococcal 0-64 years Vaccine  Aged [employer] History   Administered Date(s) Administered    COVID-19, Pfizer Purple top, DILUTE for use, 12+ yrs, 30mcg/0.3mL dose 07/31/2021, 08/21/2021    DTaP (Infanrix) 1995, 03/04/1996, 05/24/1996, 11/04/1996, 11/03/1999    HPV 9-valent Joanne Pete) 07/25/2018, 09/27/2018, 02/07/2019    Hepatitis B 1995, 1995, 05/24/1996    Influenza Virus Vaccine 11/17/2011    MMR 11/04/1996, 11/30/1999    Meningococcal MCV4P (Menactra) 08/22/2007    Polio IPV (IPOL) 1995, 03/04/1996, 05/24/1996, 11/30/1999    Tdap (Boostrix, Adacel) 08/22/2007, 08/21/2013    Varicella (Varivax) 11/04/1996         Review of Systems:  Review of Systems   Constitutional: Negative for chills, fatigue and fever. Reports anorgasmia   Respiratory: Negative for cough and shortness of breath. Cardiovascular: Negative for chest pain and palpitations. Gastrointestinal: Negative for abdominal pain, diarrhea, nausea and vomiting. Genitourinary: Positive for vaginal bleeding (2-3 day cycles, 10 days apart). Negative for dysuria, pelvic pain and vaginal pain. Neurological: Negative for light-headedness and headaches.         Physical Exam:   Vitals:    02/23/22 0831   BP: 122/78   Pulse: 105   Temp: 98.2 °F (36.8 °C)   TempSrc: Temporal   SpO2: 98%   Weight: 135 lb 3.2 oz (61.3 kg)   Height: 5' 1\" (1.549 m)     Body mass index is 25.55 kg/m². Wt Readings from Last 3 Encounters:   02/23/22 135 lb 3.2 oz (61.3 kg)   01/19/22 142 lb 3.2 oz (64.5 kg)   01/10/22 143 lb (64.9 kg)       BP Readings from Last 3 Encounters:   02/23/22 122/78   01/19/22 118/76   01/10/22 108/74       Physical Exam  Constitutional:       Appearance: Normal appearance. Cardiovascular:      Rate and Rhythm: Normal rate and regular rhythm. Pulses: Normal pulses. Heart sounds: Normal heart sounds. Pulmonary:      Effort: Pulmonary effort is normal.      Breath sounds: Normal breath sounds. Abdominal:      General: Abdomen is flat. Bowel sounds are normal.      Palpations: Abdomen is soft. There is no mass. Hernia: No hernia is present. Musculoskeletal:         General: Normal range of motion. Skin:     General: Skin is warm and dry. Neurological:      General: No focal deficit present. Mental Status: She is alert and oriented to person, place, and time. Psychiatric:         Mood and Affect: Mood normal.         Behavior: Behavior normal.         Thought Content: Thought content normal.         Judgment: Judgment normal.          Lab Review: not applicable       Assessment/Plan:  Candice Sage is a 32 y.o. female with a Hx of anxiety with persistent tachycardia who presents today for chronic care follow-up. Jimy Morin was seen today for medication check, anxiety and tachycardia. Diagnoses and all orders for this visit:    Anxiety with persistent tachycardia:  NAVDEEP 7 SCORE 2/23/2022 1/19/2022   NAVDEEP-7 Total Score 6 11   Interpretation of NAVDEEP-7 score: 5-9 = mild anxiety, 10-14 = moderate anxiety, 15+ = severe anxiety.  Recommend referral to behavioral health for scores 10 or greater.   -1/19/2022 Last NAVDEEP-7 score 11  -Today 2/23/2022 NAVDEEP-7 score 6  -Tachycardia workup: cardiac monitor study showed sinus tachycardia during palpitation Sx but no abnormal heart rhythms.  -1/19/2022 was started on sertraline (Zoloft) 25 mg daily for 1 wk, then planned on 50 mg daily. Patient did not tolerate, side effects include feeling heavy, anorgasmia, breakthrough periods every 10 days. -Was previously on hydroxyzine 25 mg TID PRN, patient weaned off successfully.  -Start bupropion (Wellbutrin XL) 150 mg every morning for 4 days, then start taking 300 mg qam  -Will reassess NAVDEEP-7 at next visit  -Follow-up in 3 weeks to assess medication side effects  -     buPROPion (WELLBUTRIN XL) 300 MG extended release tablet; Take 1 tablet by mouth every morning    Healthcare maintenance  -Patient states she was tested for hepatitis A/B/C, negative. Niece is hep B positive (patient's sister had heroin use disorder). -Patient reported was tested for HIV and hep C, both negative. -     Lipid Panel; Future  -     Hemoglobin A1C; Future      Health Maintenance Due:  Health Maintenance Due   Topic Date Due    Hepatitis C screen  Never done    Varicella vaccine (2 of 2 - 2-dose childhood series) 11/02/1999    HIV screen  Never done    Pap smear  Never done    Flu vaccine (1) 09/01/2021    COVID-19 Vaccine (3 - Booster for Pfizer series) 01/21/2022        Health care decision maker:  <72years old      Health Maintenance: (USPSTF Recommendations)  (F) Cervical Cancer Screen: (21-29 q3yr cytology alone; 30-65 q3yr cytology alone, q5yr with hrHPV alone, or q5yr cytology+hrHPV (A)): Negative on 7/28/2021, annual w/ Karen Moreira Sales: (15-65 yr old, and all pregnant patients (A)): Per patient, tested negative at previous PCP  Hep C Screen: (18-79 yr old (B)): Per patient, test negative at previous PCP  Immunizations: [place] 2-dose completed on 8/21/2021, getting booster this Saturday. Tetanus booster on 8/21/2013. RTC:  Return in about 3 weeks (around 3/16/2022) for medication f/u.   -Bupropion (Wellbutrin) side effects  -Healthcare maintenance labs follow-up      EMR Dragon/transcription disclaimer:  Much of this encounter note is electronic transcription/translation of spoken language to printed texts. The electronic translation of spoken language may be erroneous, or at times, nonsensical words or phrases may be inadvertently transcribed.   Although I have reviewed the note for such errors, some may still exist.     Haroldo Concepcion, DO  2/23/2022

## 2022-02-23 ENCOUNTER — OFFICE VISIT (OUTPATIENT)
Dept: PRIMARY CARE CLINIC | Age: 27
End: 2022-02-23
Payer: COMMERCIAL

## 2022-02-23 ENCOUNTER — HOSPITAL ENCOUNTER (OUTPATIENT)
Age: 27
Discharge: HOME | End: 2022-02-23
Payer: COMMERCIAL

## 2022-02-23 VITALS
BODY MASS INDEX: 25.53 KG/M2 | OXYGEN SATURATION: 98 % | HEIGHT: 61 IN | SYSTOLIC BLOOD PRESSURE: 122 MMHG | HEART RATE: 105 BPM | WEIGHT: 135.2 LBS | TEMPERATURE: 98.2 F | DIASTOLIC BLOOD PRESSURE: 78 MMHG

## 2022-02-23 DIAGNOSIS — Z00.00 HEALTHCARE MAINTENANCE: ICD-10-CM

## 2022-02-23 DIAGNOSIS — F41.9 ANXIETY: Primary | ICD-10-CM

## 2022-02-23 LAB
CHOLESTEROL, TOTAL: 141 MG/DL (ref 0–199)
HDLC SERPL-MCNC: 45 MG/DL (ref 40–60)
LDL CHOLESTEROL CALCULATED: 81 MG/DL
TRIGL SERPL-MCNC: 75 MG/DL (ref 0–150)
VLDLC SERPL CALC-MCNC: 15 MG/DL

## 2022-02-23 PROCEDURE — 1036F TOBACCO NON-USER: CPT

## 2022-02-23 PROCEDURE — 80061 LIPID PANEL: CPT

## 2022-02-23 PROCEDURE — G8484 FLU IMMUNIZE NO ADMIN: HCPCS

## 2022-02-23 PROCEDURE — 83036 HEMOGLOBIN GLYCOSYLATED A1C: CPT

## 2022-02-23 PROCEDURE — 99214 OFFICE O/P EST MOD 30 MIN: CPT

## 2022-02-23 PROCEDURE — 36415 COLL VENOUS BLD VENIPUNCTURE: CPT

## 2022-02-23 PROCEDURE — G8419 CALC BMI OUT NRM PARAM NOF/U: HCPCS

## 2022-02-23 PROCEDURE — G8427 DOCREV CUR MEDS BY ELIG CLIN: HCPCS

## 2022-02-23 RX ORDER — BUPROPION HYDROCHLORIDE 300 MG/1
300 TABLET ORAL EVERY MORNING
Qty: 30 TABLET | Refills: 0 | Status: SHIPPED | OUTPATIENT
Start: 2022-02-23 | End: 2022-03-24

## 2022-02-23 ASSESSMENT — PATIENT HEALTH QUESTIONNAIRE - PHQ9
6. FEELING BAD ABOUT YOURSELF - OR THAT YOU ARE A FAILURE OR HAVE LET YOURSELF OR YOUR FAMILY DOWN: 0
1. LITTLE INTEREST OR PLEASURE IN DOING THINGS: 1
SUM OF ALL RESPONSES TO PHQ QUESTIONS 1-9: 5
8. MOVING OR SPEAKING SO SLOWLY THAT OTHER PEOPLE COULD HAVE NOTICED. OR THE OPPOSITE, BEING SO FIGETY OR RESTLESS THAT YOU HAVE BEEN MOVING AROUND A LOT MORE THAN USUAL: 0
SUM OF ALL RESPONSES TO PHQ9 QUESTIONS 1 & 2: 1
2. FEELING DOWN, DEPRESSED OR HOPELESS: 0
4. FEELING TIRED OR HAVING LITTLE ENERGY: 1
5. POOR APPETITE OR OVEREATING: 2
9. THOUGHTS THAT YOU WOULD BE BETTER OFF DEAD, OR OF HURTING YOURSELF: 0
3. TROUBLE FALLING OR STAYING ASLEEP: 1
7. TROUBLE CONCENTRATING ON THINGS, SUCH AS READING THE NEWSPAPER OR WATCHING TELEVISION: 0
10. IF YOU CHECKED OFF ANY PROBLEMS, HOW DIFFICULT HAVE THESE PROBLEMS MADE IT FOR YOU TO DO YOUR WORK, TAKE CARE OF THINGS AT HOME, OR GET ALONG WITH OTHER PEOPLE: 1
SUM OF ALL RESPONSES TO PHQ QUESTIONS 1-9: 5

## 2022-02-23 ASSESSMENT — ANXIETY QUESTIONNAIRES
2. NOT BEING ABLE TO STOP OR CONTROL WORRYING: 1
1. FEELING NERVOUS, ANXIOUS, OR ON EDGE: 1
4. TROUBLE RELAXING: 1
6. BECOMING EASILY ANNOYED OR IRRITABLE: 1
3. WORRYING TOO MUCH ABOUT DIFFERENT THINGS: 1
IF YOU CHECKED OFF ANY PROBLEMS ON THIS QUESTIONNAIRE, HOW DIFFICULT HAVE THESE PROBLEMS MADE IT FOR YOU TO DO YOUR WORK, TAKE CARE OF THINGS AT HOME, OR GET ALONG WITH OTHER PEOPLE: SOMEWHAT DIFFICULT
5. BEING SO RESTLESS THAT IT IS HARD TO SIT STILL: 0
GAD7 TOTAL SCORE: 6
7. FEELING AFRAID AS IF SOMETHING AWFUL MIGHT HAPPEN: 1

## 2022-02-23 ASSESSMENT — ENCOUNTER SYMPTOMS
VOMITING: 0
SHORTNESS OF BREATH: 0
COUGH: 0
ABDOMINAL PAIN: 0
DIARRHEA: 0
NAUSEA: 0

## 2022-02-24 LAB
ESTIMATED AVERAGE GLUCOSE: 91.1 MG/DL
HBA1C MFR BLD: 4.8 %

## 2022-02-28 DIAGNOSIS — F41.9 ANXIETY: ICD-10-CM

## 2022-02-28 RX ORDER — BUPROPION HYDROCHLORIDE 300 MG/1
300 TABLET ORAL EVERY MORNING
Qty: 30 TABLET | Refills: 0 | Status: CANCELLED | OUTPATIENT
Start: 2022-02-28

## 2022-02-28 NOTE — TELEPHONE ENCOUNTER
Refill Request     Last Seen: Visit date not found    Last Written: 2/23/22    Next Appointment:   Future Appointments   Date Time Provider Malik Millan   3/29/2022 12:30 PM DO Yulia Peraza [facility]             Requested Prescriptions      No prescriptions requested or ordered in this encounter

## 2022-03-22 DIAGNOSIS — F41.9 ANXIETY: ICD-10-CM

## 2022-03-24 RX ORDER — BUPROPION HYDROCHLORIDE 300 MG/1
TABLET ORAL
Qty: 60 TABLET | Refills: 3
Start: 2022-03-24 | End: 2022-03-26 | Stop reason: SDUPTHER

## 2022-03-26 DIAGNOSIS — F41.9 ANXIETY: ICD-10-CM

## 2022-03-28 DIAGNOSIS — F41.9 ANXIETY: ICD-10-CM

## 2022-03-28 RX ORDER — BUPROPION HYDROCHLORIDE 300 MG/1
300 TABLET ORAL EVERY MORNING
Qty: 60 TABLET | Refills: 2
Start: 2022-03-28 | End: 2022-03-29 | Stop reason: SDUPTHER

## 2022-03-28 RX ORDER — BUPROPION HYDROCHLORIDE 300 MG/1
TABLET ORAL
Qty: 30 TABLET | OUTPATIENT
Start: 2022-03-28

## 2022-03-29 DIAGNOSIS — F41.9 ANXIETY: ICD-10-CM

## 2022-03-29 RX ORDER — BUPROPION HYDROCHLORIDE 300 MG/1
300 TABLET ORAL EVERY MORNING
Qty: 60 TABLET | Refills: 2 | Status: SHIPPED | OUTPATIENT
Start: 2022-03-29 | End: 2022-04-13 | Stop reason: SINTOL

## 2022-03-29 NOTE — PROGRESS NOTES
Refill not received by pharmacy. Refill of Bupropion (Wellbutrin XL) 300 mg ordered.     Skylar Rivera,   3/29/2022

## 2022-03-29 NOTE — PROGRESS NOTES
[address] and [facility]                                             [address], [facility] [place] [address]        Phone: [phone]      Name:  Khushi Neal  :    1995    Consultants:   Patient Care Team:  Dexter Eason DO as PCP - General (Family Medicine)    Chief Complaint:     Khushi Neal is a 32 y.o. female  who presents today for an established patient care visit with Personalized Prevention Plan Services as noted below. Chief Complaint   Patient presents with    Anxiety     pt. states she doesn't like the new medication and the previous medication 'killed' her sex drive.  Other     questions about menstrual cycle    Health Maintenance       HPI:     Khushi Neal 32 y.o.  female with past medical history of anxiety to follow up on anxiety medications. Anxiety   Ronni score 5 3/30/2022  phq-9 score 5 3/30/2022  Patient stated that she started on hydroxyzine previously and was discontinued because she was feeling tired. Then she was started on zoloft, and that \"killed her sexual drive\". Patient was then started on wellbutrin 300mg once daily during her last visit in the office. Patient likes it and she is feeling more edgy and irritable. Patient would like to change medication. Patient stated that overall she feels better and her anxiety has improved from before. Patient stated that she has IUD and for 4 years she hasn't had a menstrual cycle. Patient said that since she started zoloft she's been having regular menstrual cycle. After she started wellbutrin, her last menstural cycle was for 8 days. Patient follows Dr. Geraldo Naranjo OB/GYN and she has an appointment with in May. Patient denies any SOB, cough, palpitations, chest pain, abdominal pain, fevers, or N/V/D.     Health maintenance  Patient follows Dr. Sayda Salazar, last pap smear was last year and it was normal.   Hepatitis C negative on 7/18/2017  Hiv screen negative 7/18/2017  Patient deferred vaccines for later visit. Patient Active Problem List   Diagnosis    Tachycardia    Palpitations    Anxiety    IUD (intrauterine device) in place    Idiopathic scoliosis    Cervical cancer screening         Past Medical History:    Past Medical History:   Diagnosis Date    Anxiety     Scoliosis        Past Surgical History:  History reviewed. No pertinent surgical history. Home Meds:  Prior to Visit Medications    Medication Sig Taking? Authorizing Provider   fluvoxaMINE (LUVOX CR) 100 MG CP24 extended release capsule Take 100 mg by mouth nightly Yes Shaneka García, DO   buPROPion (WELLBUTRIN XL) 300 MG extended release tablet Take 1 tablet by mouth every morning Yes Jemima Almonte,    levonorgestrel (MIRENA) IUD 52 mg 1 each by IntraUTERine route Yes Historical Provider, MD   Multiple Vitamin (MULTIVITAMIN ADULT PO) Take 1 capsule by mouth daily Yes Historical Provider, MD   sertraline (ZOLOFT) 50 MG tablet Take 25 mg daily for 1 wk, then start taking 50 mg daily. Patient not taking: Reported on 3/30/2022  Jemima Almonte DO       Allergies:    Patient has no known allergies.     Family History:       Problem Relation Age of Onset    Thyroid Disease Mother     Heart Attack Father     Lupus Half-Brother     Mult Sclerosis Half-Brother     Cancer Maternal Grandmother         tongue    Breast Cancer Paternal Grandmother          Health Maintenance Completed:  Health Maintenance   Topic Date Due    Hepatitis C screen  Never done    Varicella vaccine (2 of 2 - 2-dose childhood series) 11/02/1999    HIV screen  Never done    Pap smear  Never done    Flu vaccine (1) 09/01/2021    COVID-19 Vaccine (3 - Booster for Pfizer series) 01/21/2022    Depression Screen  03/30/2023    DTaP/Tdap/Td vaccine (5 - Td or Tdap) 08/21/2023    Hepatitis B vaccine  Completed    HPV vaccine  Completed    Hepatitis and suicidal ideas. Physical Exam:   Vitals:    03/30/22 0940   BP: 118/72   Site: Left Upper Arm   Position: Sitting   Cuff Size: Medium Adult   Pulse: 90   Resp: 18   Temp: 97.9 °F (36.6 °C)   TempSrc: Infrared   SpO2: 98%   Weight: 132 lb (59.9 kg)   Height: 5' 1\" (1.549 m)     Body mass index is 24.94 kg/m². Wt Readings from Last 3 Encounters:   03/30/22 132 lb (59.9 kg)   02/23/22 135 lb 3.2 oz (61.3 kg)   01/19/22 142 lb 3.2 oz (64.5 kg)       BP Readings from Last 3 Encounters:   03/30/22 118/72   02/23/22 122/78   01/19/22 118/76       Constitutional:       General:  Not in acute distress. Appearance: Not ill-appearing, toxic-appearing or diaphoretic. HENT:      Head: Normocephalic and atraumatic. Right Ear: Tympanic membrane, ear canal and external ear normal. There is no impacted cerumen. Left Ear: Tympanic membrane, ear canal and external ear normal. There is no impacted cerumen. Nose: Nose normal. No congestion or rhinorrhea. Mouth/Throat:      Mouth: Mucous membranes are moist.      Pharynx: Oropharynx is clear. No oropharyngeal exudate or posterior oropharyngeal erythema. Eyes:      General: No scleral icterus. Right eye: No discharge. Left eye: No discharge. Extraocular Movements: Extraocular movements intact. Conjunctiva/sclera: Conjunctivae normal.      Pupils: Pupils are equal, round, and reactive to light. Neck:      Vascular: No carotid bruit. Cardiovascular:      Rate and Rhythm: Normal rate and regular rhythm. Pulses: Normal pulses. Heart sounds: Normal heart sounds. No murmur heard. No friction rub. No gallop. Pulmonary:      Effort: Pulmonary effort is normal. No respiratory distress. Breath sounds: No stridor. No wheezing, rhonchi or rales. Chest:      Chest wall: No tenderness. Abdominal:      General: Abdomen is flat. Bowel sounds are normal. There is no distension. Palpations: Abdomen is soft. Tenderness: There is no abdominal tenderness. There is no right CVA tenderness, left CVA tenderness, guarding or rebound. Hernia: No hernia is present. Musculoskeletal:         General: No swelling, tenderness, deformity or signs of injury. Normal range of motion. Cervical back: Normal range of motion. No rigidity or tenderness. Left lower leg: No edema. Lymphadenopathy:      Cervical: No cervical adenopathy. Skin:     General: Skin is warm. Coloration: Skin is not jaundiced or pale. Findings: No bruising, erythema, lesion or rash. Neurological:      General: No focal deficit present. Mental Status: Alert and oriented to person, place, and time. Cranial Nerves: No cranial nerve deficit. Sensory: No sensory deficit. Motor: No weakness. Gait: Gait normal.   Psychiatric:         Mood and Affect: Mood normal.         Behavior: Behavior normal.         Thought Content: Thought content normal.         Judgment: Judgment normal.           Lab Review:   Hospital Outpatient Visit on 02/23/2022   Component Date Value    Hemoglobin A1C 02/23/2022 4.8     eAG 02/23/2022 91.1     Cholesterol, Total 02/23/2022 141     Triglycerides 02/23/2022 75     HDL 02/23/2022 45     LDL Calculated 02/23/2022 81     VLDL Cholesterol Calcula* 02/23/2022 15           Assessment/Plan:  Rae Landeros 32 y.o.  female with past medical history of anxiety to follow up on anxiety medications. Anxiety  - improving but not controlled   - Discontinued wellbutrin as patient had some side effects including edgy and irritability. Advised patient to taper down dose, take 150mg for two days then stop it. - Started patient on fluvoxaMINE (LUVOX CR) 100 MG CP24 extended release capsule; Take 100 mg by mouth nightly  as fluvoxamine may cause less sexual dysfunction than other SSRI(Recommendation B, from AAFP article).   - Educated patient about SSRI medication mechanism of action, indications, contraindications, side effects and when to stop the medication, patient showed agreement. - Return to clinic in 2 weeks to follow up on medication side effects and anxiety. Cervical Cancer screen  - Patient follows Dr. Marlene Deluca, last pap smear was last year and it was normal.   - Advised patient to get pap smear results when return to office. Irregular menstrual cycle  Patient has IUD  - Last Menstrual cycle for 8 days.  - Follow up with Dr. Henny Ricci as scheduled. - Return to office as needed. - Return to clinic if symptoms got worse. Health Maintenance Due:  Health Maintenance Due   Topic Date Due    Hepatitis C screen  Never done    Varicella vaccine (2 of 2 - 2-dose childhood series) 11/02/1999    HIV screen  Never done    Pap smear  Never done    Flu vaccine (1) 09/01/2021    COVID-19 Vaccine (3 - Booster for Pfizer series) 01/21/2022          Health care decision maker:  <72years old      Health Maintenance: (USPSTF Recommendations)  (HIV Screen: (16-65 yr old, and all pregnant patients (A)): Hiv screen negative 7/18/2017  Hep C Screen: (18-79 yr old (B)): Hepatitis C negative on 7/18/2017  [facility] [address] Screen: (all pts with cirrhosis and high risk Hep B (US q6 mo)):  Immunizations:  Patient deferred vaccines for later visit. I counseled patient about the recommended vaccines, including effectiveness, side effects, and the diseases they prevent. Patient had the opportunity to ask questions and share in the decision to vaccinate. RTC:  Return in about 2 weeks (around 4/13/2022) to follow up on medication and anxiety. EMR Dragon/transcription disclaimer:  Much of this encounter note is electronic transcription/translation of spoken language to printed texts. The electronic translation of spoken language may be erroneous, or at times, nonsensical words or phrases may be inadvertently transcribed.   Although I have reviewed the note for such errors, some may still exist.     [place], [place]  3/30/2022

## 2022-03-30 ENCOUNTER — OFFICE VISIT (OUTPATIENT)
Dept: PRIMARY CARE CLINIC | Age: 27
End: 2022-03-30
Payer: COMMERCIAL

## 2022-03-30 VITALS
TEMPERATURE: 97.9 F | OXYGEN SATURATION: 98 % | WEIGHT: 132 LBS | BODY MASS INDEX: 24.92 KG/M2 | HEART RATE: 90 BPM | DIASTOLIC BLOOD PRESSURE: 72 MMHG | HEIGHT: 61 IN | RESPIRATION RATE: 18 BRPM | SYSTOLIC BLOOD PRESSURE: 118 MMHG

## 2022-03-30 DIAGNOSIS — F41.9 ANXIETY: Primary | ICD-10-CM

## 2022-03-30 DIAGNOSIS — Z12.4 CERVICAL CANCER SCREENING: ICD-10-CM

## 2022-03-30 PROCEDURE — G8484 FLU IMMUNIZE NO ADMIN: HCPCS

## 2022-03-30 PROCEDURE — 1036F TOBACCO NON-USER: CPT

## 2022-03-30 PROCEDURE — G8420 CALC BMI NORM PARAMETERS: HCPCS

## 2022-03-30 PROCEDURE — G8427 DOCREV CUR MEDS BY ELIG CLIN: HCPCS

## 2022-03-30 PROCEDURE — 99214 OFFICE O/P EST MOD 30 MIN: CPT

## 2022-03-30 RX ORDER — FLUVOXAMINE MALEATE 100 MG/1
100 CAPSULE, EXTENDED RELEASE ORAL NIGHTLY
Qty: 30 CAPSULE | Refills: 0 | Status: SHIPPED | OUTPATIENT
Start: 2022-03-30

## 2022-03-30 SDOH — ECONOMIC STABILITY: TRANSPORTATION INSECURITY
IN THE PAST 12 MONTHS, HAS LACK OF TRANSPORTATION KEPT YOU FROM MEETINGS, WORK, OR FROM GETTING THINGS NEEDED FOR DAILY LIVING?: NO

## 2022-03-30 SDOH — ECONOMIC STABILITY: TRANSPORTATION INSECURITY
IN THE PAST 12 MONTHS, HAS THE LACK OF TRANSPORTATION KEPT YOU FROM MEDICAL APPOINTMENTS OR FROM GETTING MEDICATIONS?: NO

## 2022-03-30 ASSESSMENT — PATIENT HEALTH QUESTIONNAIRE - PHQ9
6. FEELING BAD ABOUT YOURSELF - OR THAT YOU ARE A FAILURE OR HAVE LET YOURSELF OR YOUR FAMILY DOWN: 0
9. THOUGHTS THAT YOU WOULD BE BETTER OFF DEAD, OR OF HURTING YOURSELF: 0
2. FEELING DOWN, DEPRESSED OR HOPELESS: 0
8. MOVING OR SPEAKING SO SLOWLY THAT OTHER PEOPLE COULD HAVE NOTICED. OR THE OPPOSITE, BEING SO FIGETY OR RESTLESS THAT YOU HAVE BEEN MOVING AROUND A LOT MORE THAN USUAL: 0
SUM OF ALL RESPONSES TO PHQ QUESTIONS 1-9: 5
SUM OF ALL RESPONSES TO PHQ9 QUESTIONS 1 & 2: 1
10. IF YOU CHECKED OFF ANY PROBLEMS, HOW DIFFICULT HAVE THESE PROBLEMS MADE IT FOR YOU TO DO YOUR WORK, TAKE CARE OF THINGS AT HOME, OR GET ALONG WITH OTHER PEOPLE: 1
SUM OF ALL RESPONSES TO PHQ QUESTIONS 1-9: 5
4. FEELING TIRED OR HAVING LITTLE ENERGY: 1
1. LITTLE INTEREST OR PLEASURE IN DOING THINGS: 1
SUM OF ALL RESPONSES TO PHQ QUESTIONS 1-9: 5
7. TROUBLE CONCENTRATING ON THINGS, SUCH AS READING THE NEWSPAPER OR WATCHING TELEVISION: 0
3. TROUBLE FALLING OR STAYING ASLEEP: 2
5. POOR APPETITE OR OVEREATING: 1
SUM OF ALL RESPONSES TO PHQ QUESTIONS 1-9: 5

## 2022-03-30 ASSESSMENT — ANXIETY QUESTIONNAIRES
GAD7 TOTAL SCORE: 5
IF YOU CHECKED OFF ANY PROBLEMS ON THIS QUESTIONNAIRE, HOW DIFFICULT HAVE THESE PROBLEMS MADE IT FOR YOU TO DO YOUR WORK, TAKE CARE OF THINGS AT HOME, OR GET ALONG WITH OTHER PEOPLE: SOMEWHAT DIFFICULT
5. BEING SO RESTLESS THAT IT IS HARD TO SIT STILL: 0
1. FEELING NERVOUS, ANXIOUS, OR ON EDGE: 2
7. FEELING AFRAID AS IF SOMETHING AWFUL MIGHT HAPPEN: 0
2. NOT BEING ABLE TO STOP OR CONTROL WORRYING: 0
3. WORRYING TOO MUCH ABOUT DIFFERENT THINGS: 0
6. BECOMING EASILY ANNOYED OR IRRITABLE: 2
4. TROUBLE RELAXING: 1

## 2022-03-30 ASSESSMENT — LIFESTYLE VARIABLES: HOW OFTEN DO YOU HAVE A DRINK CONTAINING ALCOHOL: NEVER

## 2022-04-05 ENCOUNTER — TELEPHONE (OUTPATIENT)
Dept: PRIMARY CARE CLINIC | Age: 27
End: 2022-04-05

## 2022-04-05 NOTE — TELEPHONE ENCOUNTER
Please resend    fluvoxaMINE (LUVOX CR) 100 MG CP24 extended release capsule    PT's pharmacy told her this medication would need a PA and then that the PA was denied. Our system says a PA isnt needed. PT would like to be able to get her medication.         PT gave permission to leave   [phone]

## 2022-04-06 NOTE — TELEPHONE ENCOUNTER
Please complete PA for medication ASAP. Patient had side effects from previous SSRI and will need to be started on fluvoxamine. Thank you and please let me know if you have any questions.      Shantanu Sauceda  4/6/2022

## 2022-04-11 NOTE — TELEPHONE ENCOUNTER
Braden Marroquin,  Could you please assist in completing the PA for this medication? Please refer to my last note for assistance in completion. I have discussed this case with Dr. Jose Salcido and pt will require a PA due to failure of prior SSRI/SNRI therapy. Please let me know if you have further questions. I am happy to sign the PA when I return on Friday, otherwise, if needed, please have Dr. Dragan Bland or Dr. Ursula Carter co-sign. Thank you and please let me know if you have any questions.      [place], [place]  4/11/2022

## 2022-04-11 NOTE — TELEPHONE ENCOUNTER
Spoke with insurance company the ER needs a prior authorization, the regular formula does not. Please advise.

## 2022-04-11 NOTE — TELEPHONE ENCOUNTER
I'm waiting on a response from the provider before doing the PA. I will send the dx code once I have his response.

## 2022-04-13 DIAGNOSIS — F41.9 ANXIETY: Primary | ICD-10-CM

## 2022-04-13 RX ORDER — FLUVOXAMINE MALEATE 100 MG
100 TABLET ORAL NIGHTLY
Qty: 30 TABLET | Refills: 3 | Status: SHIPPED | OUTPATIENT
Start: 2022-04-13

## 2022-04-13 NOTE — TELEPHONE ENCOUNTER
Dx code is not needed,  the pharmacy needs to know if the pt can take the non ER dose of the medication, if not a different medication needs to be sent.

## 2022-04-14 NOTE — TELEPHONE ENCOUNTER
Please notify pharmacy that patient can take the non ER dose of the medication. I have also ordered a second non-ER prescription to the pharmacy. Thank you and please let me know if you have any questions.      Shantanu Sauceda  4/13/2022

## 2022-04-29 PROBLEM — Z12.4 CERVICAL CANCER SCREENING: Status: RESOLVED | Noted: 2022-03-30 | Resolved: 2022-04-29

## 2022-09-03 ENCOUNTER — APPOINTMENT (OUTPATIENT)
Dept: GENERAL RADIOLOGY | Age: 27
End: 2022-09-03
Payer: COMMERCIAL

## 2022-09-03 ENCOUNTER — HOSPITAL ENCOUNTER (EMERGENCY)
Age: 27
Discharge: HOME | End: 2022-09-04
Attending: EMERGENCY MEDICINE
Payer: COMMERCIAL

## 2022-09-03 DIAGNOSIS — R07.0 THROAT PAIN: Primary | ICD-10-CM

## 2022-09-03 PROCEDURE — 99284 EMERGENCY DEPT VISIT MOD MDM: CPT

## 2022-09-03 PROCEDURE — 71045 X-RAY EXAM CHEST 1 VIEW: CPT

## 2022-09-03 PROCEDURE — 6370000000 HC RX 637 (ALT 250 FOR IP): Performed by: PHYSICIAN ASSISTANT

## 2022-09-03 RX ORDER — ONDANSETRON 4 MG/1
4 TABLET, ORALLY DISINTEGRATING ORAL ONCE
Status: COMPLETED | OUTPATIENT
Start: 2022-09-03 | End: 2022-09-03

## 2022-09-03 RX ADMIN — ONDANSETRON 4 MG: 4 TABLET, ORALLY DISINTEGRATING ORAL at 23:08

## 2022-09-04 VITALS
TEMPERATURE: 97.8 F | BODY MASS INDEX: 26.45 KG/M2 | DIASTOLIC BLOOD PRESSURE: 62 MMHG | WEIGHT: 140 LBS | SYSTOLIC BLOOD PRESSURE: 105 MMHG | RESPIRATION RATE: 17 BRPM | HEART RATE: 103 BPM | OXYGEN SATURATION: 98 %

## 2022-09-04 LAB — HCG(URINE) PREGNANCY TEST: NEGATIVE

## 2022-09-04 PROCEDURE — 84703 CHORIONIC GONADOTROPIN ASSAY: CPT

## 2022-09-04 PROCEDURE — 2709999900 HC NON-CHARGEABLE SUPPLY: Performed by: INTERNAL MEDICINE

## 2022-09-04 PROCEDURE — 3609017100 HC EGD: Performed by: INTERNAL MEDICINE

## 2022-09-04 PROCEDURE — 6360000002 HC RX W HCPCS: Performed by: INTERNAL MEDICINE

## 2022-09-04 PROCEDURE — 99152 MOD SED SAME PHYS/QHP 5/>YRS: CPT | Performed by: INTERNAL MEDICINE

## 2022-09-04 RX ORDER — MIDAZOLAM HYDROCHLORIDE 5 MG/ML
INJECTION INTRAMUSCULAR; INTRAVENOUS PRN
Status: DISCONTINUED | OUTPATIENT
Start: 2022-09-04 | End: 2022-09-04 | Stop reason: ALTCHOICE

## 2022-09-04 RX ORDER — FENTANYL CITRATE 50 UG/ML
INJECTION, SOLUTION INTRAMUSCULAR; INTRAVENOUS PRN
Status: DISCONTINUED | OUTPATIENT
Start: 2022-09-04 | End: 2022-09-04 | Stop reason: ALTCHOICE

## 2022-09-04 RX ORDER — LIDOCAINE HYDROCHLORIDE 20 MG/ML
15 SOLUTION OROPHARYNGEAL PRN
Qty: 15 ML | Refills: 0 | Status: SHIPPED | OUTPATIENT
Start: 2022-09-04

## 2022-09-04 NOTE — ED NOTES
Pt states she found a ride home. Awaiting ride to come back for pt discharge.       Tracy Tomlinson RN  09/04/22 [id number]

## 2022-09-04 NOTE — ED NOTES
Pt friend came to pick her up. D/C instructions given to friend for pt to read later in the day.        Ozzy Pena RN  09/04/22 0930

## 2022-09-04 NOTE — ED NOTES
Pt resting on cot in position of comfort. Respirations regular. Airway intact. GCS 15. Pt controlling own secretions. Pt holding conversation with this RN w/o difficulty.  0 s/s of distress. Awaiting further orders. Will continue to monitor.        Ozzy Pena RN  09/03/22 [address], RN  09/03/22 3944

## 2022-09-04 NOTE — ED PROVIDER NOTES
I independently performed a history and physical on [address]. All diagnostic, treatment, and disposition decisions were made by myself in conjunction with the advanced practice provider/resident. For further details of [facility] emergency department encounter, please see the ANIKET/resident's documentation. I personally saw the patient and performed a substantive portion of the visit including all aspects of the medical decision making. Briefly, this is a 30-year-old female presenting for evaluation of throat pain. She states that she was at a grill out, she thinks that she may have ingested a metal bristle from a grill brush. She states that she has a painful sensation in the back left of her pharynx as well as deeper down in the esophagus. She is able to tolerate p.o. at this time. On exam, she is anxious, tachycardic to the 110s. No respiratory distress no difficulty tolerating oral secretions. Will obtain x-ray of the chest and then discuss her case with GI to determine whether she should stay in the emergency department for emergent EGD or this can be done as an outpatient. Comment: Please note this report has been produced using speech recognition software and may contain errors related to that system including errors in grammar, punctuation, and spelling, as well as words and phrases that may be inappropriate. If there are any questions or concerns please feel free to contact the dictating provider for clarification.      Liane Carrasco MD  09/03/22 1464

## 2022-09-04 NOTE — CONSULTS
Placed call to GI @ [phone]  RE: possible metal bristle in throat per Isaak Padron MD called back @ [phone]

## 2022-09-04 NOTE — ED PROVIDER NOTES
[facility] Emergency Department    CHIEF COMPLAINT  Other (Object stuck in throat pt thought brissel from wire brush or steak. Pt was grilling out when she choked. Able to eat and drink at this time)      [facility] VISIT  I have seen and evaluated this patient with my supervising physician, Dr. Lyudmila Vicente. HISTORY OF PRESENT ILLNESS  Aditya Philip is a 32 y.o. female who presents to the ED complaining of throat pain. Patient states she was eating dinner tonight and felt a sharp stabbing pain on the left side of her throat. Patient thinks that she swallowed a metal bristle from a wire brush. Patient states she had steak and potatoes for dinner. Patient states she is experiencing pain with swallowing as well as drinking fluids. Patient states she is also experiencing some nausea due to gag reflex being activated. Patient denies any headaches, body aches, fevers or chills. Patient denies any coughing or sneezing. Patient denies any vision changes. Patient has any chest pain, shortness of breath, dyspnea on exertion. Patient denies any nausea, vomiting, diarrhea, or abdominal pain. Patient denies any urinary symptoms. Patient denies any new onset back pain. Patient denies any recent travel or sick contacts. No other complaints, modifying factors or associated symptoms. Nursing notes reviewed. Past Medical History:   Diagnosis Date    Anxiety     Scoliosis      No past surgical history on file.   Family History   Problem Relation Age of Onset    Thyroid Disease Mother     Heart Attack Father     Lupus Half-Brother     Mult Sclerosis Half-Brother     Cancer Maternal Grandmother         tongue    Breast Cancer Paternal Grandmother      Social History     Socioeconomic History    Marital status: Single     Spouse name: Not on file    Number of children: Not on file    Years of education: Not on file    Highest education level: Not on file   Occupational History    Not on file   Tobacco Use    Smoking status: Never    Smokeless tobacco: Never   Vaping Use    Vaping Use: Former   Substance and Sexual Activity    Alcohol use: Not Currently    Drug use: Not Currently    Sexual activity: Yes     Partners: Male   Other Topics Concern    Not on file   Social History Narrative    Not on file     Social Determinants of Health     Financial Resource Strain: High Risk    Difficulty of Paying Living Expenses: Hard   Food Insecurity: No Food Insecurity    Worried About Running Out of Food in the Last Year: Never true    Ran Out of Food in the Last Year: Never true   Transportation Needs: No Transportation Needs    Lack of Transportation (Medical): No    Lack of Transportation (Non-Medical): No   Physical Activity: Insufficiently Active    Days of Exercise per Week: 1 day    Minutes of Exercise per Session: 30 min   Stress: Not on file   Social Connections: Not on file   Intimate Partner Violence: Not At Risk    Fear of Current or Ex-Partner: No    Emotionally Abused: No    Physically Abused: No    Sexually Abused: No   Housing Stability: Not on file     No current facility-administered medications for this encounter. Current Outpatient Medications   Medication Sig Dispense Refill    fluvoxaMINE (LUVOX) 100 MG tablet Take 1 tablet by mouth nightly 30 tablet 3    fluvoxaMINE (LUVOX CR) 100 MG CP24 extended release capsule Take 100 mg by mouth nightly 30 capsule 0    levonorgestrel (MIRENA) IUD 52 mg 1 each by IntraUTERine route      Multiple Vitamin (MULTIVITAMIN ADULT PO) Take 1 capsule by mouth daily       No Known Allergies    REVIEW OF SYSTEMS  10 systems reviewed, pertinent positives per HPI otherwise noted to be negative    PHYSICAL EXAM  BP (!) 141/95   Pulse (!) 120   Temp 97.8 °F (36.6 °C)   Resp 18   Wt 140 lb (63.5 kg)   LMP  (LMP Unknown)   SpO2 100%   BMI 26.45 kg/m²   GENERAL APPEARANCE: Awake and alert. Cooperative. In acute distress.   Nontoxic in appearance. HEAD: Normocephalic. Atraumatic. No ernst signs or raccoon eyes. EYES: EOM's grossly intact. No conjunctival injection or discharge. ENT: Mucous membranes are pink and moist.  No foreign bodies noted in the oropharynx. No blood in the oropharynx. NECK: Supple. Full range of motion. No nuchal rigidity. No tracheal tenderness or deviation. No stridor. HEART: RRR. No murmurs, rubs or gallops. Normal S1-S2. No S3 or S4.  LUNGS: Respirations unlabored. CTAB. Good air exchange. Speaking comfortably in full sentences. ABDOMEN: Soft. Non-distended. Non-tender. No guarding or rebound. No masses. No organomegaly. EXTREMITIES: No peripheral edema. Moves all extremities equally. All extremities neurovascularly intact. SKIN: Warm and dry. No acute rashes. NEUROLOGICAL: Alert and oriented. CN's 2-12 intact. No slurred speech. No gross facial drooping. Strength 5/5, sensation intact. PSYCHIATRIC: Normal mood and affect. RADIOLOGY  No results found. ED COURSE  78-year-old female presents ED for evaluation of throat pain and feeling as if she is got a wire bristle stuck in her throat. Benign physical exam.  No abnormalities noted on x-ray. Patient received Zofran for nausea, with good relief. Triage vitals /95, pulse of 120, respirations 18, temperature 97.8 °F, SPO2 90% room air. She remained tachycardic during stay with the rest of vital signs were stable. A discussion was had with Dr. Zackary Stokes, gastroenterologist, regarding foreign body in throat. Dr. Zcakary Stokes came to the ED and performed upper GI scope with no abnormal findings. Current recommendation was discharged home with continuation of regular activities starting tomorrow. Risk management discussed and shared decision making had with patient and/or surrogate. All questions were answered. Patient will follow up with primary care provider and gastroenterologist for further evaluation/treatment. All questions answered. Patient will return to ED for new/worsening symptoms. CRITICAL CARE TIME  0 minutes of critical care time spent not including separately billable procedures. MDM  Results for orders placed or performed during the hospital encounter of 09/03/22   Pregnancy, Urine   Result Value Ref Range    HCG(Urine) Pregnancy Test Negative Detects HCG level >20 MIU/mL       I estimate there is LOW risk for PULMONARY EMBOLISM, ACUTE CORONARY SYNDROME, OR THORACIC AORTIC DISSECTION, thus I consider the discharge disposition reasonable. Apple Delatorre and I have discussed the diagnosis and risks, and we agree with discharging home to follow-up with their primary doctor. We also discussed returning to the Emergency Department immediately if new or worsening symptoms occur. We have discussed the symptoms which are most concerning (e.g., bloody sputum, fever, worsening pain or shortness of breath, vomiting) that necessitate immediate return. FINAL Impression    1. Throat pain        Blood pressure 105/62, pulse (!) 103, temperature 97.8 °F (36.6 °C), resp. rate 17, weight 140 lb (63.5 kg), SpO2 98 %. DISPOSITION  Patient was discharged to home in good condition.          Bienvenido Nava PA-C  09/07/22 1875

## 2022-09-04 NOTE — ED NOTES
Prior to procedure pt educated that she would need a ride home. Pt verbalized understanding and stated that she had called  for a ride home. Pt then made CONSTANZA RN aware that she had called  but he had been \"drinking\" and \"is in no shape to drive\". Pt also told ENDO RN that \"\"my aunt Rut Garvey may be able to get me but I think she has been drinking also\". When pt is more alert this writer will attempt to assist pt to find a sober ride home.        Desiree Palacio RN  09/04/22 5478

## 2022-09-04 NOTE — H&P
Gastroenterology Preop Assessment    Patient:   Kennedy Laboy   :    1995   Facility:   [address]  Referring/PCP: Aliyah Nava DO  Date:     2022    Subjective:   Procedure: egd    HPI/Reason for procedure:  31 yo female with pain in the neck and concern for swallowed barbecue bristle. Risks and benefits of EGD discussed    Past Medical History:   Diagnosis Date    Anxiety     Scoliosis      No past surgical history on file. Social:   Social History     Tobacco Use    Smoking status: Never    Smokeless tobacco: Never   Substance Use Topics    Alcohol use: Not Currently     Family:   Family History   Problem Relation Age of Onset    Thyroid Disease Mother     Heart Attack Father     Lupus Half-Brother     Mult Sclerosis Half-Brother     Cancer Maternal Grandmother         tongue    Breast Cancer Paternal Grandmother        Scheduled Medications:     Current Medications:    Prior to Admission medications    Medication Sig Start Date End Date Taking? Authorizing Provider   fluvoxaMINE (LUVOX) 100 MG tablet Take 1 tablet by mouth nightly 22   Andra Betancourt, DO   fluvoxaMINE (LUVOX CR) 100 MG CP24 extended release capsule Take 100 mg by mouth nightly 3/30/22   Andra Betancourt DO   levonorgestrel [facility]) IUD 52 mg 1 each by IntraUTERine route 3/30/18   Historical Provider, MD   Multiple Vitamin (MULTIVITAMIN ADULT PO) Take 1 capsule by mouth daily    Historical Provider, MD       No current facility-administered medications for this encounter.     Current Outpatient Medications:     fluvoxaMINE (LUVOX) 100 MG tablet, Take 1 tablet by mouth nightly, Disp: 30 tablet, Rfl: 3    fluvoxaMINE (LUVOX CR) 100 MG CP24 extended release capsule, Take 100 mg by mouth nightly, Disp: 30 capsule, Rfl: 0    levonorgestrel (MIRENA) IUD 52 mg, 1 each by IntraUTERine route, Disp: , Rfl:     Multiple Vitamin (MULTIVITAMIN ADULT PO), Take 1 capsule by mouth daily, Disp: , Rfl:       Infusions:   PRN Medications: Allergies: No Known Allergies      Objective:     Physical Exam:   BP (!) 140/95   Pulse (!) 102   Temp 97.8 °F (36.6 °C)   Resp 18   Wt 140 lb (63.5 kg)   LMP  (LMP Unknown)   SpO2 100%   BMI 26.45 kg/m²     HEENT: NCAT  Lungs: CTAB  CV: RRR  Abd: soft, ntd  Ext: dpi    Lab and Imaging Review   Labs:  CBC: No results for input(s): WBC, HGB, HCT, MCV, PLT in the last 72 hours. BMP: No results for input(s): NA, K, CL, CO2, PHOS, BUN, CREATININE, CA in the last 72 hours. LIVER PROFILE: No results for input(s): AST, ALT, LIPASE, PROT, BILIDIR, BILITOT, ALKPHOS in the last 72 hours. Invalid input(s): AMYLASE,  ALB  PT/INR: No results for input(s): INR in the last 72 hours. Invalid input(s): PT    Pre-Procedure Assessment / Plan:  ASA: Class 2 - A normal healthy patient with mild systemic disease  Airway: Mallampati: II (soft palate, uvula, fauces visible)  Level of Sedation Plan: Moderate sedation  Post Procedure plan: Return to same level of care      Plan:   egd    I assessed the patient and find that the patient is in satisfactory condition to proceed with the planned procedure and sedation plan. I have explained the risk, benefits, and alternatives to the procedure; the patient understands and agrees to proceed.        Alexus Linares DO  9/4/2022

## 2022-09-04 NOTE — DISCHARGE INSTRUCTIONS
ENDOSCOPY:  Inspection of any cavity of the body by means of an endoscopy. An endoscope is a flexible tube that allows direct visualization of the cavity. You have had a Esophagogastroduodenoscopy    Discharge Instructions    1)  You may experience some lightheadedness for the next several hours. We suggest you plan on quiet relation for the rest of the day. 2)  Because of the sedative drugs in your blood steam, be advised that you should not drive, operate any machinery, or sign contracts for the remainder of the day. 3)  You should not take any alcoholic beverages tonight, and only take sleeping medication that has been specifically prescribed for you by your physician. 4)  Unless instructed differently, resume your regular diet. Eat smaller portions for your first meal and progress to normal amounts over the rest of the day. 5)  If you have any fever, chills, excessive bleeding, uncontrolled pain, increased abdominal bloating, or any other problems, notify your doctor or return to the hospital.    6)  If you have a sore throat, you may use lozenges, or salt water gargles.     7) Call for biopsy results in one week:  [phone]    9)  Special Discharge Instructions:

## 2022-11-03 ENCOUNTER — TELEPHONE (OUTPATIENT)
Dept: PRIMARY CARE CLINIC | Age: 27
End: 2022-11-03

## 2022-11-03 NOTE — TELEPHONE ENCOUNTER
Pt tested positive for covid this morning with a home test. She is going to try and send us a picture of the test through [employer]. Pt's symptoms started yesterday. Pt has fever, congestion, body aches. Pt would like paxlovid. [id number]   [address] [facility], [address] [place] [address]   Phone:  [phone]  Fax:  [phone]      Please call Pt with updates.   [phone]

## 2022-11-04 RX ORDER — NIRMATRELVIR AND RITONAVIR 150-100 MG
KIT ORAL
Qty: 20 TABLET | Refills: 0 | Status: SHIPPED | OUTPATIENT
Start: 2022-11-04 | End: 2022-11-09

## 2022-11-04 NOTE — TELEPHONE ENCOUNTER
This was routed to my inbox after I left for the day yesterday, even though I was not the preceptor for the afternoon    It looks like Dr Enid Jimenez sent medication in today    Please ensure patient is aware.

## 2023-01-03 ENCOUNTER — APPOINTMENT (OUTPATIENT)
Dept: CT IMAGING | Age: 28
End: 2023-01-03
Payer: COMMERCIAL

## 2023-01-03 ENCOUNTER — HOSPITAL ENCOUNTER (EMERGENCY)
Age: 28
Discharge: HOME | End: 2023-01-03
Attending: EMERGENCY MEDICINE
Payer: COMMERCIAL

## 2023-01-03 VITALS
OXYGEN SATURATION: 98 % | TEMPERATURE: 98.9 F | SYSTOLIC BLOOD PRESSURE: 126 MMHG | RESPIRATION RATE: 16 BRPM | HEART RATE: 86 BPM | DIASTOLIC BLOOD PRESSURE: 88 MMHG

## 2023-01-03 DIAGNOSIS — R11.0 NAUSEA WITHOUT VOMITING: ICD-10-CM

## 2023-01-03 DIAGNOSIS — R10.33 PERIUMBILICAL ABDOMINAL PAIN: ICD-10-CM

## 2023-01-03 DIAGNOSIS — K43.9 VENTRAL HERNIA WITHOUT OBSTRUCTION OR GANGRENE: Primary | ICD-10-CM

## 2023-01-03 LAB
A/G RATIO: 1.5 (ref 1.1–2.2)
ALBUMIN SERPL-MCNC: 4.5 G/DL (ref 3.4–5)
ALP BLD-CCNC: 27 U/L (ref 40–129)
ALT SERPL-CCNC: 30 U/L (ref 10–40)
ANION GAP SERPL CALCULATED.3IONS-SCNC: 11 MMOL/L (ref 3–16)
AST SERPL-CCNC: 41 U/L (ref 15–37)
BACTERIA: ABNORMAL /HPF
BASOPHILS ABSOLUTE: 0.1 K/UL (ref 0–0.2)
BASOPHILS RELATIVE PERCENT: 0.7 %
BILIRUB SERPL-MCNC: 0.4 MG/DL (ref 0–1)
BILIRUBIN URINE: NEGATIVE
BLOOD, URINE: NEGATIVE
BUN BLDV-MCNC: 9 MG/DL (ref 7–20)
CALCIUM SERPL-MCNC: 9.4 MG/DL (ref 8.3–10.6)
CHLORIDE BLD-SCNC: 101 MMOL/L (ref 99–110)
CLARITY: CLEAR
CO2: 22 MMOL/L (ref 21–32)
COLOR: YELLOW
CREAT SERPL-MCNC: 0.6 MG/DL (ref 0.6–1.1)
EOSINOPHILS ABSOLUTE: 0.1 K/UL (ref 0–0.6)
EOSINOPHILS RELATIVE PERCENT: 0.7 %
EPITHELIAL CELLS, UA: ABNORMAL /HPF (ref 0–5)
GFR SERPL CREATININE-BSD FRML MDRD: >60 ML/MIN/{1.73_M2}
GLUCOSE BLD-MCNC: 107 MG/DL (ref 70–99)
GLUCOSE URINE: NEGATIVE MG/DL
HCG QUALITATIVE: NEGATIVE
HCT VFR BLD CALC: 45.6 % (ref 36–48)
HEMOGLOBIN: 15.3 G/DL (ref 12–16)
KETONES, URINE: NEGATIVE MG/DL
LACTIC ACID: 1.6 MMOL/L (ref 0.4–2)
LEUKOCYTE ESTERASE, URINE: ABNORMAL
LIPASE: 30 U/L (ref 13–60)
LYMPHOCYTES ABSOLUTE: 1.6 K/UL (ref 1–5.1)
LYMPHOCYTES RELATIVE PERCENT: 14.6 %
MCH RBC QN AUTO: 30.3 PG (ref 26–34)
MCHC RBC AUTO-ENTMCNC: 33.6 G/DL (ref 31–36)
MCV RBC AUTO: 90.2 FL (ref 80–100)
MICROSCOPIC EXAMINATION: YES
MONOCYTES ABSOLUTE: 0.5 K/UL (ref 0–1.3)
MONOCYTES RELATIVE PERCENT: 4.2 %
NEUTROPHILS ABSOLUTE: 8.9 K/UL (ref 1.7–7.7)
NEUTROPHILS RELATIVE PERCENT: 79.8 %
NITRITE, URINE: NEGATIVE
PDW BLD-RTO: 12.7 % (ref 12.4–15.4)
PH UA: 7.5 (ref 5–8)
PLATELET # BLD: 122 K/UL (ref 135–450)
PMV BLD AUTO: 8.6 FL (ref 5–10.5)
POTASSIUM REFLEX MAGNESIUM: 5 MMOL/L (ref 3.5–5.1)
PROTEIN UA: NEGATIVE MG/DL
RBC # BLD: 5.05 M/UL (ref 4–5.2)
RBC UA: ABNORMAL /HPF (ref 0–4)
SODIUM BLD-SCNC: 134 MMOL/L (ref 136–145)
SPECIFIC GRAVITY UA: 1.01 (ref 1–1.03)
TOTAL PROTEIN: 7.5 G/DL (ref 6.4–8.2)
URINE TYPE: ABNORMAL
UROBILINOGEN, URINE: 0.2 E.U./DL
WBC # BLD: 11.2 K/UL (ref 4–11)
WBC UA: ABNORMAL /HPF (ref 0–5)

## 2023-01-03 PROCEDURE — 6360000004 HC RX CONTRAST MEDICATION: Performed by: NURSE PRACTITIONER

## 2023-01-03 PROCEDURE — 80053 COMPREHEN METABOLIC PANEL: CPT

## 2023-01-03 PROCEDURE — 83605 ASSAY OF LACTIC ACID: CPT

## 2023-01-03 PROCEDURE — 85025 COMPLETE CBC W/AUTO DIFF WBC: CPT

## 2023-01-03 PROCEDURE — 81001 URINALYSIS AUTO W/SCOPE: CPT

## 2023-01-03 PROCEDURE — 36415 COLL VENOUS BLD VENIPUNCTURE: CPT

## 2023-01-03 PROCEDURE — 84703 CHORIONIC GONADOTROPIN ASSAY: CPT

## 2023-01-03 PROCEDURE — 74177 CT ABD & PELVIS W/CONTRAST: CPT

## 2023-01-03 PROCEDURE — 99285 EMERGENCY DEPT VISIT HI MDM: CPT

## 2023-01-03 PROCEDURE — 83690 ASSAY OF LIPASE: CPT

## 2023-01-03 RX ORDER — PYRAZINAMIDE 500 MG/1
1 TABLET ORAL 3 TIMES DAILY PRN
Qty: 10 TABLET | Refills: 0 | Status: SHIPPED | OUTPATIENT
Start: 2023-01-03 | End: 2023-01-06

## 2023-01-03 RX ORDER — ONDANSETRON 4 MG/1
4 TABLET, FILM COATED ORAL EVERY 8 HOURS PRN
Qty: 20 TABLET | Refills: 0 | Status: SHIPPED | OUTPATIENT
Start: 2023-01-03

## 2023-01-03 RX ORDER — DICYCLOMINE HYDROCHLORIDE 10 MG/1
10 CAPSULE ORAL 4 TIMES DAILY
Qty: 360 CAPSULE | Refills: 1 | Status: SHIPPED | OUTPATIENT
Start: 2023-01-03 | End: 2023-01-06

## 2023-01-03 RX ORDER — POLYETHYLENE GLYCOL 3350 17 G/17G
17 POWDER, FOR SOLUTION ORAL DAILY
Qty: 238 G | Refills: 0 | Status: SHIPPED | OUTPATIENT
Start: 2023-01-03 | End: 2023-01-08

## 2023-01-03 RX ADMIN — IOPAMIDOL 75 ML: 755 INJECTION, SOLUTION INTRAVENOUS at 16:17

## 2023-01-03 ASSESSMENT — ENCOUNTER SYMPTOMS
VOMITING: 0
DIARRHEA: 0
WHEEZING: 0
BACK PAIN: 0
COLOR CHANGE: 0
SHORTNESS OF BREATH: 0
ABDOMINAL PAIN: 1
NAUSEA: 1
COUGH: 0

## 2023-01-03 ASSESSMENT — PAIN - FUNCTIONAL ASSESSMENT
PAIN_FUNCTIONAL_ASSESSMENT: 0-10
PAIN_FUNCTIONAL_ASSESSMENT: 0-10

## 2023-01-03 ASSESSMENT — PAIN SCALES - GENERAL
PAINLEVEL_OUTOF10: 7
PAINLEVEL_OUTOF10: 7

## 2023-01-03 ASSESSMENT — PAIN DESCRIPTION - LOCATION
LOCATION: ABDOMEN
LOCATION: ABDOMEN

## 2023-01-03 NOTE — ED PROVIDER NOTES
**ADVANCED PRACTICE PROVIDER, I HAVE EVALUATED THIS [facility]  ED  EMERGENCY DEPARTMENT ENCOUNTER      Pt Name: Dane Rider  XYK:[id number]  [place] 1995  Date of evaluation: 1/3/2023  Provider: SAROJ Keane CNP  Note Started: 5:29 PM EST 1/3/2023        Chief Complaint:    Chief Complaint   Patient presents with    Hernia     Pt sts belly pain started on 22, pt doing a lot of physical work around that time, pt told to come in for hernia          Nursing Notes, Past Medical Hx, Past Surgical Hx, Social Hx, Allergies, and Family Hx were all reviewed and agreed with or any disagreements were addressed in the HPI.    HPI: (Location, Duration, Timing, Severity, Quality, Assoc Sx, Context, Modifying factors)    History From: patient  Limitations to history : None    Chief Complaint of umbilical abdominal pain    This is a  32 y.o. female who presents to the emergency department umbilical abdominal pain, patient states that she started having pain Noemi yeni, she does a lot of physical labor at home, she states she has to cut wood because they have a wood-burning fireplace that heat their home, she is also raising 3 children, 1 of which is her sisters that she is had since she was 6month-old as her sister  of an overdose. Patient states she is just having worsening abdominal pain associated with some nausea but no vomiting or diarrhea, no urinary symptoms, she denies any cough, congestion, fever or chills, no abnormal vaginal bleeding or discharge or urinary symptoms. She is not taking any medicine for pain, she denies any additional complaints. No additional aggravating relieving factors. Patient presents awake, alert and in no acute respiratory distress or toxic appearance.     PastMedical/Surgical History:      Diagnosis Date    Anxiety     Scoliosis          Procedure Laterality Date    UPPER GASTROINTESTINAL ENDOSCOPY N/A 2022    EGD DIAGNOSTIC ONLY performed by Majo Golden DO at [address] [place] [address]       Medications:  Previous Medications    FLUVOXAMINE (LUVOX CR) 100 MG CP24 EXTENDED RELEASE CAPSULE    Take 100 mg by mouth nightly    FLUVOXAMINE (LUVOX) 100 MG TABLET    Take 1 tablet by mouth nightly    LEVONORGESTREL (MIRENA) IUD 52 MG    1 each by IntraUTERine route    LIDOCAINE VISCOUS HCL (XYLOCAINE) 2 % SOLN SOLUTION    Take 15 mLs by mouth as needed for Irritation    MULTIPLE VITAMIN (MULTIVITAMIN ADULT PO)    Take 1 capsule by mouth daily       Review of Systems:  (1 systems needed)  Review of Systems   Constitutional:  Negative for chills and fever. HENT:  Negative for congestion. Respiratory:  Negative for cough, shortness of breath and wheezing. Cardiovascular:  Negative for chest pain. Gastrointestinal:  Positive for abdominal pain and nausea. Negative for diarrhea and vomiting. Patient presents with umbilical abdominal pain, patient states that she started having pain Noemi yeni, she does a lot of physical labor at home, she states she has to cut wood because they have a wood-burning fireplace that heat their home   Genitourinary:  Negative for difficulty urinating, dysuria, frequency and hematuria. She denies any urinary symptoms, no vaginal bleeding or discharge. Musculoskeletal:  Negative for back pain. Skin:  Negative for color change. Neurological:  Negative for weakness, numbness and headaches. \"Positives and Pertinent negatives as per HPI\"    Physical Exam:  Physical Exam  Vitals and nursing note reviewed. Constitutional:       Appearance: She is well-developed. She is not diaphoretic. HENT:      Head: Normocephalic. Right Ear: External ear normal.      Left Ear: External ear normal.   Eyes:      General:         Right eye: No discharge. Left eye: No discharge. Cardiovascular:      Rate and Rhythm: Normal rate.       Comments: Normal S1 and 2, peripheral pulses 2+, no edema observed  Pulmonary:      Effort: Pulmonary effort is normal. No respiratory distress. Breath sounds: Normal breath sounds. Abdominal:      Palpations: Abdomen is soft. Comments: Abdomen is flat soft and nondistended. Bowel sounds are hypoactive. Tenderness in the umbilical region with some mild guarding on exam however, no rebound tenderness at McBurney's point. I cannot feel a palpable hernia or hernia that needs reduced on exam.   Musculoskeletal:         General: Normal range of motion. Cervical back: Normal range of motion and neck supple. Skin:     General: Skin is warm. Capillary Refill: Capillary refill takes less than 2 seconds. Coloration: Skin is not pale. Neurological:      General: No focal deficit present. Mental Status: She is alert and oriented to person, place, and time. GCS: GCS eye subscore is 4. GCS verbal subscore is 5. GCS motor subscore is 6.    Psychiatric:         Behavior: Behavior normal.       MEDICAL DECISION MAKING    Vitals:    Vitals:    01/03/23 1422 01/03/23 1634   BP: (!) 131/90 130/84   Pulse: (!) 118 98   Resp: 18 14   Temp: 98.9 °F (37.2 °C)    SpO2: 100% 98%       LABS:  Labs Reviewed   CBC WITH AUTO DIFFERENTIAL - Abnormal; Notable for the following components:       Result Value    WBC 11.2 (*)     Platelets 620 (*)     Neutrophils Absolute 8.9 (*)     All other components within normal limits   COMPREHENSIVE METABOLIC PANEL W/ REFLEX TO MG FOR LOW K - Abnormal; Notable for the following components:    Sodium 134 (*)     Glucose 107 (*)     Alkaline Phosphatase 27 (*)     AST 41 (*)     All other components within normal limits   URINALYSIS - Abnormal; Notable for the following components:    Leukocyte Esterase, Urine SMALL (*)     All other components within normal limits   MICROSCOPIC URINALYSIS - Abnormal; Notable for the following components:    Bacteria, UA Rare (*)     All other components within normal limits LIPASE   LACTIC ACID   HCG, SERUM, QUALITATIVE        Remainder of labs reviewed and were negative at this time or not returned at the time of this note. RADIOLOGY:   Non-plain film images such as CT, Ultrasound and MRI are read by the radiologist. Meron BAUM APRN - CNP have directly visualized the radiologic plain film image(s) with the below findings:      Interpretation per the Radiologist below, if available at the time of this note:    CT ABDOMEN PELVIS W IV CONTRAST Additional Contrast? None   Final Result      1. Small, fat containing, uncomplicated appearing umbilical ventral hernia. 2.  Small amount of left posterior pelvic fluid. 3.  1.5 cm simple appearing right ovarian cyst requiring no additional   follow-up in a patient of this age. 4.  Otherwise no acute pathology noted. MEDICAL DECISION MAKING / ED COURSE:    Because of high probability of sudden clinical deterioration of the patient's condition and risk of further deterioration, critical care time required my full attention to the patient's condition; which included chart data review, documentation, medication ordering, reviewing the patient's old records, reevaluation patient's cardiac, pulmonary and neurological status. Reevaluation of vital signs. Consultations with ED attending and admitting physician. Ordering, interpreting reviewing diagnostic testing. Therefore, I personally saw the patient and independently provided 18 minutes of non-concurrent critical care out of the total shared critical care time provided, direct attention to the patient's condition did not include time spent on procedures. PROCEDURES:   Procedures    None    Patient was given:  Medications   iopamidol (ISOVUE-370) 76 % injection 75 mL (75 mLs IntraVENous Given 1/3/23 1617)       CONSULTS: (Who and What was discussed)  None          Chronic Conditions affecting care:    has a past medical history of Anxiety and Scoliosis. Patient presents with umbilical abdominal pain, patient states that she started having pain Noemi yeni, she does a lot of physical labor at home, she states she has to cut wood because they have a wood-burning fireplace that heat their home. After evaluation and examination the patient blood work, urinalysis, CT scan abdomen were ordered. Patient drove her self to the ER, I cannot give her narcotics today for her pain, she also had to wait in the waiting room due to the high volume of patients. CBC shows no sepsis or anemia. Metabolic panel shows no severe electrolyte disturbances or renal failure. Liver functions show some hemolysis however additional liver functions and lipase are normal.  Lactic acid is negative. Urine shows no infection, pregnancy is negative. CT abdomen shows a small fat-containing uncomplicated appearing umbilical ventral hernia. Patient also has small amount of left posterior pelvic fluid and a right ovarian cyst but the patient did not know that she had she also has no pain or tenderness in the right lower side of her abdomen. Otherwise no acute pathology noted. At this time I do not think  to do additional diagnostics or imaging and the patient can go home with outpatient follow-up. At this time no concerns for acute surgical abdomen, sepsis or other emergent etiology. Therefore, shared medical decision was made between the patient and myself and we agreed the patient could be discharged home with outpatient follow-up. Patient was discharged home with referral back to PCP more portly follow-up with general surgery. She does have some underlying history of constipation, however I will start the patient on MiraLAX, Zofran, Bentyl and Tylenol 3 to help with her symptoms. She was given general surgery to follow-up with in the next 48 hours, instructed to call today make an appointment for follow-up to be seen. The patient tolerated their visit well.   I evaluated the patient. The physician was available for consultation as needed. The patient and / or the family were informed of the results of any tests, a time was given to answer questions, a plan was proposed and they agreed with plan. Patient verbalized understanding of discharge instructions and the patient was discharged from the department in stable condition    I am the Primary Clinician of Record. CLINICAL IMPRESSION:  1. Ventral hernia without obstruction or gangrene    2. Periumbilical abdominal pain    3. Nausea without vomiting        DISPOSITION Decision To Discharge 01/03/2023 05:25:50 PM      PATIENT REFERRED TO:  Yary Enrique DO  [address] [address]  [phone]    Schedule an appointment as soon as possible for a visit   As needed    Mina Alexander [address]: [address] [facility]  [phone]      This is a general surgeon referral, please call to make an appointment for follow-up in the next 2 days      DISCHARGE MEDICATIONS:  New Prescriptions    ACETAMINOPHEN-CODEINE (TYLENOL/CODEINE #3) 300-30 MG PER TABLET    Take 1 tablet by mouth 3 times daily as needed for Pain for up to 3 days.  Max Daily Amount: 3 tablets    DICYCLOMINE (BENTYL) 10 MG CAPSULE    Take 1 capsule by mouth 4 times daily    ONDANSETRON (ZOFRAN) 4 MG TABLET    Take 1 tablet by mouth every 8 hours as needed for Nausea    POLYETHYLENE GLYCOL (MIRALAX) 17 GM/SCOOP POWDER    Take 17 g by mouth daily for 5 days       DISCONTINUED MEDICATIONS:  Discontinued Medications    No medications on file              (Please note the MDM and HPI sections of this note were completed with a voice recognition program.  Efforts were made to edit the dictations but occasionally words are mis-transcribed.)    Electronically signed, SAROJ Roach CNP,          SAROJ Roach CNP  01/03/23 9297

## 2023-01-04 ENCOUNTER — TELEPHONE (OUTPATIENT)
Dept: PRIMARY CARE CLINIC | Age: 28
End: 2023-01-04

## 2023-01-04 NOTE — TELEPHONE ENCOUNTER
----- Message from [facility] sent at 1/4/2023  9:31 AM EST -----  Subject: Message to Provider    QUESTIONS  Information for Provider? Pt has an appt on 01/05 with Sherman Henderson DO for an ED follow up at 4:15, screened green. She said she is in a ton   of pain because of her hernia and she wants to make this a video   appointment if possible. Call pt back to let her know if she can do this   as a VV, she would like the link texted to her.   ---------------------------------------------------------------------------  --------------  [address]  [phone]; OK to leave message on voicemail  ---------------------------------------------------------------------------  --------------  SCRIPT ANSWERS  Relationship to Patient?  Self

## 2023-01-05 PROBLEM — H40.053 BILATERAL OCULAR HYPERTENSION: Status: ACTIVE | Noted: 2020-01-28

## 2023-01-05 PROBLEM — H53.8 BLURRED VISION, BILATERAL: Status: ACTIVE | Noted: 2020-01-28

## 2023-01-06 ENCOUNTER — TELEMEDICINE (OUTPATIENT)
Dept: PRIMARY CARE CLINIC | Age: 28
End: 2023-01-06

## 2023-01-06 DIAGNOSIS — K21.9 GASTROESOPHAGEAL REFLUX DISEASE, UNSPECIFIED WHETHER ESOPHAGITIS PRESENT: ICD-10-CM

## 2023-01-06 DIAGNOSIS — K42.9 UMBILICAL HERNIA WITHOUT OBSTRUCTION AND WITHOUT GANGRENE: Primary | ICD-10-CM

## 2023-01-06 DIAGNOSIS — F32.1 MODERATE MAJOR DEPRESSION, SINGLE EPISODE (HCC): ICD-10-CM

## 2023-01-06 DIAGNOSIS — F41.9 ANXIETY: ICD-10-CM

## 2023-01-06 RX ORDER — PANTOPRAZOLE SODIUM 20 MG/1
20 TABLET, DELAYED RELEASE ORAL
Qty: 30 TABLET | Refills: 0 | Status: SHIPPED | OUTPATIENT
Start: 2023-01-06

## 2023-01-06 RX ORDER — DOCUSATE SODIUM 100 MG/1
100 CAPSULE, LIQUID FILLED ORAL 2 TIMES DAILY PRN
COMMUNITY

## 2023-01-06 ASSESSMENT — PATIENT HEALTH QUESTIONNAIRE - PHQ9
10. IF YOU CHECKED OFF ANY PROBLEMS, HOW DIFFICULT HAVE THESE PROBLEMS MADE IT FOR YOU TO DO YOUR WORK, TAKE CARE OF THINGS AT HOME, OR GET ALONG WITH OTHER PEOPLE: 2
4. FEELING TIRED OR HAVING LITTLE ENERGY: 3
SUM OF ALL RESPONSES TO PHQ QUESTIONS 1-9: 12
9. THOUGHTS THAT YOU WOULD BE BETTER OFF DEAD, OR OF HURTING YOURSELF: 0
7. TROUBLE CONCENTRATING ON THINGS, SUCH AS READING THE NEWSPAPER OR WATCHING TELEVISION: 3
8. MOVING OR SPEAKING SO SLOWLY THAT OTHER PEOPLE COULD HAVE NOTICED. OR THE OPPOSITE, BEING SO FIGETY OR RESTLESS THAT YOU HAVE BEEN MOVING AROUND A LOT MORE THAN USUAL: 0
3. TROUBLE FALLING OR STAYING ASLEEP: 0
6. FEELING BAD ABOUT YOURSELF - OR THAT YOU ARE A FAILURE OR HAVE LET YOURSELF OR YOUR FAMILY DOWN: 3
2. FEELING DOWN, DEPRESSED OR HOPELESS: 1
SUM OF ALL RESPONSES TO PHQ QUESTIONS 1-9: 12
5. POOR APPETITE OR OVEREATING: 1
1. LITTLE INTEREST OR PLEASURE IN DOING THINGS: 1
SUM OF ALL RESPONSES TO PHQ9 QUESTIONS 1 & 2: 2

## 2023-01-06 ASSESSMENT — ENCOUNTER SYMPTOMS
SHORTNESS OF BREATH: 0
COUGH: 0
ABDOMINAL DISTENTION: 0
WHEEZING: 0
CONSTIPATION: 1
BACK PAIN: 0
CHOKING: 0
DIARRHEA: 0
ABDOMINAL PAIN: 1
NAUSEA: 0
VOMITING: 0

## 2023-01-06 ASSESSMENT — ANXIETY QUESTIONNAIRES
1. FEELING NERVOUS, ANXIOUS, OR ON EDGE: 2
7. FEELING AFRAID AS IF SOMETHING AWFUL MIGHT HAPPEN: 2
3. WORRYING TOO MUCH ABOUT DIFFERENT THINGS: 3
5. BEING SO RESTLESS THAT IT IS HARD TO SIT STILL: 1
GAD7 TOTAL SCORE: 17
6. BECOMING EASILY ANNOYED OR IRRITABLE: 3
4. TROUBLE RELAXING: 3
2. NOT BEING ABLE TO STOP OR CONTROL WORRYING: 3
IF YOU CHECKED OFF ANY PROBLEMS ON THIS QUESTIONNAIRE, HOW DIFFICULT HAVE THESE PROBLEMS MADE IT FOR YOU TO DO YOUR WORK, TAKE CARE OF THINGS AT HOME, OR GET ALONG WITH OTHER PEOPLE: SOMEWHAT DIFFICULT

## 2023-01-06 NOTE — PROGRESS NOTES
Still in pain- sees surgeon Griselda mariee  at [address] [facility].    Would like to discuss depression medication and getting on it due to missing doses from refill problems.

## 2023-01-06 NOTE — PROGRESS NOTES
[address] [facility]                                             [address] [place] [address], [address] [place], [place] [id number]        Phone: [phone]      Name:  Candy Lebron  :    1995    Consultants:   Patient Care Team:  Dub Hamman, DO as PCP - General (Family Medicine)    Chief Complaint:     Candy Lebron is a 32 y.o. female  who presents today for an established patient care visit with Personalized Prevention Plan Services as noted below. HPI:     Patient is a 26-year-old female with past medical history of anxiety and scoliosis. Patient presents in office today for ER follow-up on  for new umbilical hernia. Umbilical hernia  Patient states that initial symptoms were present on 22 after patient was chopping wood at her home. Patient states that symptoms persisted for 3 days and then self resolved. Patient described pain at the time as 10 out of 10 and was sharp in nature. Patient states that symptoms recurred on 1/3/2023 at which time patient went to the ER. CT was performed and showed ventral umbilical hernia along with posterior pelvic fluid and a right simple ovarian cyst.  At the ER, patient was not found to have a palpable umbilical hernia. At that time, there were no signs of strangulation or incarceration. Patient was discharged on MiraLAX, Zofran, Tylenol 3, dicyclomine. Patient states that she has not been taking dicyclomine or Zofran at this time. Instead of MiraLAX, patient has been using stool softener. Today, patient states that pain has only mildly improved since ER visit 3 days ago. Patient states that pain is not as severe as initial onset of symptoms back in December. Patient states that she often feels bloated. She has difficulty sleeping and usually sleeps on her stomach which is causing an exacerbation of her pain.   She also has difficulty sitting for long periods of time including in the car. Patient already has scheduled follow-up with general surgery, Dr. Holli Abdi, on Tuesday, 1/10/2023 at 10 AM.  Patient denies any nausea, vomiting, diarrhea. Patient states that she has baseline constipation. Patient states that bowel movements have not significantly changed in the last several days but has more difficulty with passing gas secondary to pain. Patient states that she has noticed an increase in symptoms with regards to acid reflux since symptoms began. She is not currently taking medication at this time. Earlier this morning, visit type had to be switched from in person to virtual given patient's 2 younger children were recently diagnosed with strep throat and have not been treated yet. Pt is currently asymptomatic. Anxiety/Depression  Patient states that she has recently become more anxious secondary to uncertainty of diagnosis of umbilical hernia. Patient states that she has worried if surgical intervention is needed. She says that she has many questions with general surgeon when she sees them on Tuesday. Patient is currently taking fluvoxamine 100 mg daily. Patient has noted increasing NAVDEEP-7 (was 17 today, was 6 back in Feb 2022) since last visit but feels that this increases secondary to recent diagnosis. She has moderate depression based on PHQ9 (score of 12) today, but also feels that this is secondary to her recent diagnosis.          Patient Active Problem List   Diagnosis    Tachycardia    Palpitations    Anxiety    IUD (intrauterine device) in place    Idiopathic scoliosis    Bilateral ocular hypertension    Blurred vision, bilateral         Past Medical History:    Past Medical History:   Diagnosis Date    Anxiety     Scoliosis        Past Surgical History:  Past Surgical History:   Procedure Laterality Date    UPPER GASTROINTESTINAL ENDOSCOPY N/A 9/4/2022    EGD DIAGNOSTIC ONLY performed by Marixa Pruett DO at [address] [place] [address] ENDOSCOPY       Home Meds:  Prior to Visit Medications    Medication Sig Taking? Authorizing Provider   docusate sodium (COLACE) 100 MG capsule Take 100 mg by mouth 2 times daily as needed for Constipation Yes Historical Provider, MD   pantoprazole (PROTONIX) 20 MG tablet Take 1 tablet by mouth every morning (before breakfast) Yes Rosy Hand DO   ondansetron (ZOFRAN) 4 MG tablet Take 1 tablet by mouth every 8 hours as needed for Nausea Yes SAROJ Mccarthy CNP   polyethylene glycol (MIRALAX) 17 GM/SCOOP powder Take 17 g by mouth daily for 5 days Yes SAROJ Mccarthy CNP   acetaminophen-codeine (TYLENOL/CODEINE #3) 300-30 MG per tablet Take 1 tablet by mouth 3 times daily as needed for Pain for up to 3 days. Max Daily Amount: 3 tablets Yes SAROJ Mccarthy CNP   levonorgestrel (MIRENA) IUD 52 mg 1 each by IntraUTERine route Yes Historical Provider, MD   Multiple Vitamin (MULTIVITAMIN ADULT PO) Take 1 capsule by mouth daily Yes Historical Provider, MD   dicyclomine (BENTYL) 10 MG capsule Take 1 capsule by mouth 4 times daily  Patient not taking: Reported on 1/6/2023  SAROJ Mccarthy CNP   fluvoxaMINE (LUVOX CR) 100 MG CP24 extended release capsule Take 100 mg by mouth nightly  Patient not taking: Reported on 1/6/2023  Gable Lombard, DO       Allergies:    Patient has no known allergies.     Family History:       Problem Relation Age of Onset    Thyroid Disease Mother     Heart Attack Father     Lupus Half-Brother     Mult Sclerosis Half-Brother     Cancer Maternal Grandmother         tongue    Breast Cancer Paternal Grandmother          Health Maintenance Completed:  Health Maintenance   Topic Date Due    Varicella vaccine (2 of 2 - 2-dose childhood series) 11/02/1999    HIV screen  Never done    Hepatitis C screen  Never done    Pap smear  Never done    COVID-19 Vaccine (3 - Booster for Pfizer series) 10/16/2021    Flu vaccine (1) 08/01/2022    Depression Screen  03/30/2023 DTaP/Tdap/Td vaccine (5 - Td or Tdap) 08/21/2023    Hepatitis A vaccine  Aged Out    Hib vaccine  Aged Out    Meningococcal (ACWY) vaccine  Aged Out    Pneumococcal 0-64 years Vaccine  Aged [employer] History   Administered Date(s) Administered    COVID-19, PFIZER PURPLE top, DILUTE for use, (age 15 y+), 30mcg/0.3mL 07/31/2021, 08/21/2021    DTaP (Infanrix) 1995, 03/04/1996, 05/24/1996, 11/04/1996, 11/03/1999    HPV 9-valent Agustin Ledbetterozer) 07/25/2018, 09/27/2018, 02/07/2019    Hepatitis B [birth year], 05/24/1996    Influenza Virus Vaccine 11/17/2011    MMR 11/04/1996, 11/30/1999    Meningococcal MCV4P (Menactra) 08/22/2007    Polio IPV (IPOL) 1995, 03/04/1996, 05/24/1996, 11/30/1999    Tdap (Boostrix, Adacel) 08/22/2007, 08/21/2013    Varicella (Varivax) 11/04/1996         Review of Systems:  Review of Systems   Constitutional:  Negative for chills, fatigue and fever. Respiratory:  Negative for cough, choking, shortness of breath and wheezing. Cardiovascular:  Negative for chest pain and palpitations. Gastrointestinal:  Positive for abdominal pain and constipation. Negative for abdominal distention, diarrhea, nausea and vomiting. Bloating   Musculoskeletal:  Negative for arthralgias, back pain and myalgias. Neurological:  Negative for weakness, light-headedness, numbness and headaches. Psychiatric/Behavioral:  Positive for sleep disturbance. The patient is nervous/anxious. Physical Exam:   There were no vitals filed for this visit. There is no height or weight on file to calculate BMI. Wt Readings from Last 3 Encounters:   09/03/22 140 lb (63.5 kg)   03/30/22 132 lb (59.9 kg)   02/23/22 135 lb 3.2 oz (61.3 kg)       BP Readings from Last 3 Encounters:   01/03/23 126/88   09/04/22 105/62   03/30/22 118/72       Physical Exam  Constitutional:       General: She is not in acute distress. Appearance: Normal appearance. She is normal weight.  She is not ill-appearing, toxic-appearing or diaphoretic. HENT:      Head: Normocephalic and atraumatic. Eyes:      General: No scleral icterus. Right eye: No discharge. Left eye: No discharge. Extraocular Movements: Extraocular movements intact. Conjunctiva/sclera: Conjunctivae normal.      Pupils: Pupils are equal, round, and reactive to light. Pulmonary:      Effort: Pulmonary effort is normal.   Neurological:      General: No focal deficit present. Mental Status: She is alert and oriented to person, place, and time. Mental status is at baseline. Psychiatric:         Mood and Affect: Mood normal.         Behavior: Behavior normal.      Comments: Visibly anxious during telemed visit.              Lab Review:   Admission on 01/03/2023, Discharged on 01/03/2023   Component Date Value    WBC 01/03/2023 11.2 (A)     RBC 01/03/2023 5.05     Hemoglobin 01/03/2023 15.3     Hematocrit 01/03/2023 45.6     MCV 01/03/2023 90.2     MCH 01/03/2023 30.3     MCHC 01/03/2023 33.6     RDW 01/03/2023 12.7     Platelets 26/51/0853 122 (A)     MPV 01/03/2023 8.6     Neutrophils % 01/03/2023 79.8     Lymphocytes % 01/03/2023 14.6     Monocytes % 01/03/2023 4.2     Eosinophils % 01/03/2023 0.7     Basophils % 01/03/2023 0.7     Neutrophils Absolute 01/03/2023 8.9 (A)     Lymphocytes Absolute 01/03/2023 1.6     Monocytes Absolute 01/03/2023 0.5     Eosinophils Absolute 01/03/2023 0.1     Basophils Absolute 01/03/2023 0.1     Sodium 01/03/2023 134 (A)     Potassium reflex Magnesi* 01/03/2023 5.0     Chloride 01/03/2023 101     CO2 01/03/2023 22     Anion Gap 01/03/2023 11     Glucose 01/03/2023 107 (A)     BUN 01/03/2023 9     Creatinine 01/03/2023 0.6     Est, Glom Filt Rate 01/03/2023 >60     Calcium 01/03/2023 9.4     Total Protein 01/03/2023 7.5     Albumin 01/03/2023 4.5     Albumin/Globulin Ratio 01/03/2023 1.5     Total Bilirubin 01/03/2023 0.4     Alkaline Phosphatase 01/03/2023 27 (A)     ALT 01/03/2023 30     AST 01/03/2023 41 (A)     Lipase 01/03/2023 30.0     Lactic Acid 01/03/2023 1.6     Color, UA 01/03/2023 Yellow     Clarity, UA 01/03/2023 Clear     Glucose, Ur 01/03/2023 Negative     Bilirubin Urine 01/03/2023 Negative     Ketones, Urine 01/03/2023 Negative     Specific Gravity, UA 01/03/2023 1.010     Blood, Urine 01/03/2023 Negative     pH, UA 01/03/2023 7.5     Protein, UA 01/03/2023 Negative     Urobilinogen, Urine 01/03/2023 0.2     Nitrite, Urine 01/03/2023 Negative     Leukocyte Esterase, Urine 01/03/2023 SMALL (A)     Microscopic Examination 01/03/2023 YES     Urine Type 01/03/2023 NotGiven     WBC, UA 01/03/2023 3-5     RBC, UA 01/03/2023 3-4     Epithelial Cells, UA 01/03/2023 2-5     Bacteria, UA 01/03/2023 Rare (A)     hCG Qual 01/03/2023 Negative    Admission on 09/03/2022, Discharged on 09/04/2022   Component Date Value    HCG(Urine) Pregnancy Test 09/04/2022 Negative           Assessment/Plan:  Juan R Sanchez was seen today for follow-up from hospital.    Diagnoses and all orders for this visit:    Umbilical hernia without obstruction and without gangrene    Gastroesophageal reflux disease, unspecified whether esophagitis present    Anxiety    Other orders  -     pantoprazole (PROTONIX) 20 MG tablet; Take 1 tablet by mouth every morning (before breakfast)    Patient is a 75-year-old female with past medical history of anxiety and scoliosis. Patient presents in office today for ER follow-up on 1/3/2023. Umbilical hernia/GERD  Not controlled, new acute complaint today as ER follow-up. Pt with persistent pain, but without symptoms of bowel strangulation at today's visit (despite being virtual). Recommended pt to continue using OTC Tylenol 1000 mg three times daily PRN for pain control (after finishing Tylenol 3 as prescribed by ER). Pt can use sparing utility of Ibuprofen OTC PRN if pain persists. Pt can continue Zofran PRN as prescribed by ER if nausea reoccurs.    Recommended pt stop stool softener usage and switch to Miralax in order to improve constipation and avoid bloating sensation. Recommended good sleep hygiene in addition to limiting lifting objects weighing more than 10-15 lbs. Advised pt to f/u with Dr. Johnny Mathews on 1/10/23 for evaluation. Advised pt that if severe pain reoccurs or current pain worsens to go to the ER immediately for possible evaluation of incarcerated or strangulated bowel. Given new onset symptoms consistent with GERD, will prescribed Pantoprazole 20 mg daily. Will need to check Mg level at future visit. RTC after surgical intervention for follow-up on symptoms. Anxiety/Depression   Not controlled, not at goal.   Recent exacerbation in symptoms secondary to new diagnosis of umbilical hernia. GAD7 was 17 today, PHQ of 12. Pt is currently taking Fluvoxamine 100 mg daily. Pt is taking medication as prescribed now. Advised pt to continue this. RTC in 4 weeks to be re-evaluated for possible titration of medication. Health Maintenance Due:  Health Maintenance Due   Topic Date Due    Varicella vaccine (2 of 2 - 2-dose childhood series) 11/02/1999    HIV screen  Never done    Hepatitis C screen  Never done    Pap smear  Never done    COVID-19 Vaccine (3 - Booster for Pfizer series) 10/16/2021    Flu vaccine (1) 08/01/2022          Health care decision maker:  <72years old      Health Maintenance: (USPSTF Recommendations)  (F) Breast Cancer Screen: (40-49 (C), 50-74 biennial screening mammogram (B))  (F) Cervical Cancer Screen: (21-29 q3yr cytology alone; 30-65 q3yr cytology alone, q5yr with hrHPV alone, or q5yr cytology+hrHPV (A))  CRC/Colonoscopy Screening: (adults 45-49 (B), 50-75 (A))  Lung Ca Screening: Annual LDCT (+smoker age 49-80, smoked within 15 years, total of 20 pack yr history (B)):  DEXA Screen: (women >65 and older, <65 if at risk/postmenopausal (B))  HIV Screen: (16-65 yr old, and all pregnant patients (A)):   Hep C Screen: (18-79 yr old (B)):  HCC Screen: (all pts with cirrhosis and high risk Hep B (US q6 mo)):  Immunizations:    RTC:  Return in about 4 weeks (around 2/3/2023) for for Anxiety f/u. EMR Dragon/transcription disclaimer:  Much of this encounter note is electronic transcription/translation of spoken language to printed texts. The electronic translation of spoken language may be erroneous, or at times, nonsensical words or phrases may be inadvertently transcribed. Although I have reviewed the note for such errors, some may still exist.     Kennybraydon Cassi, was evaluated through a synchronous (real-time) audio-video encounter. The patient (or guardian if applicable) is aware that this is a billable service, which includes applicable co-pays. This Virtual Visit was conducted with patient's (and/or legal guardian's) consent. The visit was conducted pursuant to the emergency declaration under the [address] [place] [address], [address] [place] [address] authority and the [employer] and [employer] General Act. Patient identification was verified, and a caregiver was present when appropriate. The patient was located at Home: [address] [place] [address]. Provider was located at [place] [address] ([address] [place], [place] [address]): [address] [address]. Total time spent for this encounter:  45 min    --Lyn Meza DO on 1/6/2023 at 1:48 PM    An electronic signature was used to authenticate this note.

## 2023-01-10 ENCOUNTER — INITIAL CONSULT (OUTPATIENT)
Dept: SURGERY | Age: 28
End: 2023-01-10
Payer: COMMERCIAL

## 2023-01-10 VITALS
DIASTOLIC BLOOD PRESSURE: 85 MMHG | WEIGHT: 141.6 LBS | HEIGHT: 61 IN | SYSTOLIC BLOOD PRESSURE: 119 MMHG | HEART RATE: 107 BPM | BODY MASS INDEX: 26.73 KG/M2

## 2023-01-10 DIAGNOSIS — K42.9 UMBILICAL HERNIA WITHOUT OBSTRUCTION AND WITHOUT GANGRENE: Primary | ICD-10-CM

## 2023-01-10 PROCEDURE — G8419 CALC BMI OUT NRM PARAM NOF/U: HCPCS | Performed by: SURGERY

## 2023-01-10 PROCEDURE — G8427 DOCREV CUR MEDS BY ELIG CLIN: HCPCS | Performed by: SURGERY

## 2023-01-10 PROCEDURE — 1036F TOBACCO NON-USER: CPT | Performed by: SURGERY

## 2023-01-10 PROCEDURE — G8484 FLU IMMUNIZE NO ADMIN: HCPCS | Performed by: SURGERY

## 2023-01-10 PROCEDURE — 99203 OFFICE O/P NEW LOW 30 MIN: CPT | Performed by: SURGERY

## 2023-01-10 RX ORDER — SODIUM CHLORIDE 0.9 % (FLUSH) 0.9 %
5-40 SYRINGE (ML) INJECTION PRN
OUTPATIENT
Start: 2023-01-10

## 2023-01-10 RX ORDER — POLYETHYLENE GLYCOL 3350 17 G/17G
17 POWDER, FOR SOLUTION ORAL DAILY
COMMUNITY

## 2023-01-10 RX ORDER — SODIUM CHLORIDE 0.9 % (FLUSH) 0.9 %
5-40 SYRINGE (ML) INJECTION EVERY 12 HOURS SCHEDULED
OUTPATIENT
Start: 2023-01-10

## 2023-01-10 RX ORDER — SODIUM CHLORIDE 9 MG/ML
INJECTION, SOLUTION INTRAVENOUS PRN
OUTPATIENT
Start: 2023-01-10

## 2023-01-11 NOTE — PATIENT INSTRUCTIONS
[address] [address]  Phone: [phone]  Fax: [phone] will be scheduled for surgery with Dr. Eunice Harada. The office will call you with the date and time that surgery is scheduled. Please take note of these instructions for surgery: You should have nothing by mouth after midnight the night before your surgery - this includes no food or water. Your surgery will be cancelled if you have taken anything by mouth after midnight, NO exceptions. You will need to have a history and physical prior to your surgery. This will need to be completed up to 30 days before your surgery. This H/P can be completed by your family doctor or the hospital.   IF you take coumadin (warfarin), please stop taking this medication 5 days prior to your surgery. IF you take plavix, please stop taking this 7 days prior to your surgery. Please contact our office if you have a pacemaker or defibrillator. IF you are allergic to latex, please tell our office prior to your surgery. This is important in know before scheduling your surgery. IF you are having an out patient surgery, you will need someone available to drive you home after your surgery, and to also stay with you for the rest of the day. IF you are having a surgery requiring an inpatient stay in the hospital, you will need someone to drive you home upon discharge from the hospital.  Please contact Dr. Adrian Saenz assistant Ernestina Perez if you have any questions or concerns. Please call the office with any changes in your symptoms or further questions/concerns.  [phone]

## 2023-01-11 NOTE — PROGRESS NOTES
New Patient [facility] Surgery Josemanuel CARTER [address] [facility], [address] [facility], [facility] [facility] [address], [address]  Phone: [phone]  Fax: [phone]   YOB: 1995    Date of Visit:  1/10/2023    No ref. provider found  Tyshawn Calvin DO    HPI:   Hernia: Patient is 32y.o. year old female seen at request of Tyshawn Calvin DO. Patient presents for evaluation of  umbilical hernia. Symptoms were first noted 1 month ago. Pain is sharp. There is a lump or mass present. Lump is reducible. Pt does not have risk factors of  chronic constipation, chronic cough, difficulty urinating, chronic tobacco abuse. Pt. does not have previous history of prior hernia surgery.         No Known Allergies  Outpatient Medications Marked as Taking for the 1/10/23 encounter (Initial consult) with Adelbert Duane, MD   Medication Sig Dispense Refill    polyethylene glycol (GLYCOLAX) 17 GM/SCOOP powder Take 17 g by mouth daily      pantoprazole (PROTONIX) 20 MG tablet Take 1 tablet by mouth every morning (before breakfast) 30 tablet 0    ondansetron (ZOFRAN) 4 MG tablet Take 1 tablet by mouth every 8 hours as needed for Nausea 20 tablet 0    levonorgestrel (MIRENA) IUD 52 mg 1 each by IntraUTERine route      Multiple Vitamin (MULTIVITAMIN ADULT PO) Take 1 capsule by mouth daily         Past Medical History:   Diagnosis Date    Anxiety     Scoliosis      Past Surgical History:   Procedure Laterality Date    UPPER GASTROINTESTINAL ENDOSCOPY N/A 9/4/2022    EGD DIAGNOSTIC ONLY performed by Ines Mccloud DO at [address] [place] [address]     Family History   Problem Relation Age of Onset    Thyroid Disease Mother     Heart Attack Father     Lupus Half-Brother     Mult Sclerosis Half-Brother     Cancer Maternal Grandmother         tongue    Breast Cancer Paternal Grandmother      Social History     Socioeconomic History    Marital status: Single     Spouse name: Not on file Number of children: Not on file    Years of education: Not on file    Highest education level: Not on file   Occupational History    Not on file   Tobacco Use    Smoking status: Never    Smokeless tobacco: Never   Vaping Use    Vaping Use: Former   Substance and Sexual Activity    Alcohol use: Not Currently    Drug use: Not Currently    Sexual activity: Yes     Partners: Male   Other Topics Concern    Not on file   Social History Narrative    Not on file     Social Determinants of Health     Financial Resource Strain: High Risk    Difficulty of Paying Living Expenses: Hard   Food Insecurity: No Food Insecurity    Worried About Running Out of Food in the Last Year: Never true    Ran Out of Food in the Last Year: Never true   Transportation Needs: No Transportation Needs    Lack of Transportation (Medical): No    Lack of Transportation (Non-Medical): No   Physical Activity: Insufficiently Active    Days of Exercise per Week: 1 day    Minutes of Exercise per Session: 30 min   Stress: Not on file   Social Connections: Not on file   Intimate Partner Violence: Not At Risk    Fear of Current or Ex-Partner: No    Emotionally Abused: No    Physically Abused: No    Sexually Abused: No   Housing Stability: Not on file          A review of the patient's record including allergies, medication list, tobacco history, family history, problem list, medical history and social history has been completed and updates made to the patient's EMR where indicated. Vitals:    01/10/23 1308   BP: 119/85   Site: Right Wrist   Position: Sitting   Cuff Size: Medium Adult   Pulse: (!) 107   Weight: 141 lb 9.6 oz (64.2 kg)   Height: 5' 0.98\" (1.549 m)     Body mass index is 26.77 kg/m².      Wt Readings from Last 3 Encounters:   01/10/23 141 lb 9.6 oz (64.2 kg)   09/03/22 140 lb (63.5 kg)   03/30/22 132 lb (59.9 kg)     BP Readings from Last 3 Encounters:   01/10/23 119/85   01/03/23 126/88   09/04/22 105/62          REVIEW OF SYSTEMS:   All other systems reviewed; please refer to HPI with pertinent positives, all other ROS are negative    PHYSICAL EXAM:    CONSTITUTIONAL:  awake, alert, no apparent distress and normal weight  ENT:  normocepalic, without obvious abnormality  NECK:  supple, symmetrical, trachea midline   LUNGS:  Resp easy and unlabored  CARDIOVASCULAR:    ABDOMEN:  no scars, soft, non-distended, tenderness noted at umbilicus, involuntary guarding absent,  Examination for hernias: umbilical hernia, reducible, tender, small fascial defect. MUSCULOSKELETAL: No edema  NEUROLOGIC:  Mental Status Exam:  Level of Alertness:   awake  Orientation:   person, place, time      DATA:      ASSESSMENT:     Diagnosis Orders   1. Umbilical hernia without obstruction and without gangrene              PLAN:    We will schedule the pt for minimally-invasive umbilical hernia repair, likely via intraabdominal preperitoneal approach. The technical aspects of this type of hernia repair, risks and complications were reviewed. These include but are not limited to bleeding, infections, injury to surroundings structures, need to convert to open, and hernia recurrence. Patient indicates they understand, ask appropriate questions, and agree to proceed.          Batsheva Lucio MD

## 2023-01-11 NOTE — PROGRESS NOTES
Lemon Car    Age 32 y.o.    female    1995    MRN [id number]    2/15/2023  Arrival Time_____________  OR Time____________[address]     Procedure(s):  ROBOTIC UMBILICAL HERNIA REPAIR WITH MESH, POSSIBLE OPEN PROCEDURE                      General   Surgeon(s):  Bryan Kinney, MD      DAY ADMIT ___  SDS/OP ___  OUTPT IN BED ___        Phone [phone] (home)     PCP _____________________ Phone_________________ Epic ( ) Epic CE ( ) Appt ________    ADDITIONAL INFO __________________________________ Cardio/Consult _____________    NOTES _____________________________________________________________________    ____________________________________________________________________________    PAT APPT DATE:________ TIME: ________  FAXED QAD: _______  (__) H&P w/ Hospitalist  __________________________________________________________________________  Preop Nurse phone screen complete: _____________  (__) CBC     (__) W/ DIFF ___________     (__) Hgb A1C    ___________  (__) CHEST X RAY   __________  (__) LIPID PROFILE  ___________  (__) EKG   __________  (__) PT/PTT   ___________  (__) PFT's   __________  (__) BMP   ___________  (__) CAROTIDS  __________  (__) CMP   ___________  (__) VEIN MAPPING  __________  (__) U/A   ___________  (__) HISTORY & PHYSICAL __________  (__) URINE C & S  ___________  (__) CARDIAC CLEARANCE __________  (__) U/A W/ FLEX  ___________  (__) PULM.  CLEARANCE __________  (__) SERUM PREGNANCY ___________  (__) Check Epic DOS orders __________  (__) TYPE & SCREEN __________repeat ( ) (__)  __________________ __________  (__) ALBUMIN   ___________  (__)  __________________ __________  (__) TRANSFERRIN  ___________  (__)  __________________ __________  (__) LIVER PROFILE  ___________  (__)  __________________ __________  (__) MRSA NASAL SWAB ___________  (__) URINE PREG DOS __________  (__) SED RATE  ___________  (__) BLOOD SUGAR DOS __________  (__) C-REACTIVE PROTEIN ___________    (__) VITAMIN D HYDROXY ___________  (__) BLOOD THINNERS __________    (__) ACE/ ARBS: _____________________     (__) BETABLOCKERS __________________

## 2023-01-12 ENCOUNTER — TELEPHONE (OUTPATIENT)
Dept: SURGERY | Age: 28
End: 2023-01-12

## 2023-01-12 NOTE — TELEPHONE ENCOUNTER
Pt saw Dr Aramis Lay in the office 1/10/23 and was given surgery instructions for a Robotic Umbilical Hernia Repair with Mesh, Possible Open Procedure (General Anes) scheduled 2/15/23 @ 10:15am arrival 8:15am [facility] - NPO after midnight yeni b/f surgery - Pt will need  day of surgery - [facility] to complete pre-op physical - Pt understood and agreed w/ above noted

## 2023-01-23 DIAGNOSIS — K21.9 GASTROESOPHAGEAL REFLUX DISEASE, UNSPECIFIED WHETHER ESOPHAGITIS PRESENT: Primary | ICD-10-CM

## 2023-01-24 NOTE — TELEPHONE ENCOUNTER
Refill Request       Last Seen: Last Seen Department: 1/6/2023  Last Seen by PCP: 1/6/2023    Last Written: 1/6/23 #30 with 0     Next Appointment:   Future Appointments   Date Time Provider Malik Millan   2/13/2023  2:30 PM DO Yulia Ro 7 AND [facility]             Requested Prescriptions     Pending Prescriptions Disp Refills    pantoprazole (PROTONIX) 20 MG tablet [Pharmacy Med Name: PANTOPRAZOLE SOD DR 20 MG TAB] 30 tablet 0     Sig: TAKE 1 TABLET BY MOUTH EVERY DAY BEFORE BREAKFAST

## 2023-01-27 RX ORDER — PANTOPRAZOLE SODIUM 20 MG/1
TABLET, DELAYED RELEASE ORAL
Qty: 30 TABLET | Refills: 0 | Status: SHIPPED | OUTPATIENT
Start: 2023-01-27

## 2023-02-08 ENCOUNTER — ANESTHESIA EVENT (OUTPATIENT)
Dept: OPERATING ROOM | Age: 28
End: 2023-02-08
Payer: COMMERCIAL

## 2023-02-10 RX ORDER — ACETAMINOPHEN 500 MG
500 TABLET ORAL EVERY 6 HOURS PRN
COMMUNITY

## 2023-02-10 RX ORDER — ACETAMINOPHEN,DIPHENHYDRAMINE HCL 500; 25 MG/1; MG/1
1 TABLET, FILM COATED ORAL NIGHTLY PRN
COMMUNITY

## 2023-02-10 NOTE — PROGRESS NOTES
1. Do not eat or drink anything after 12 midnight prior to surgery. This includes no water, chewing gum mints, or ice chips. You may brush your teeth and gargle the day of surgery but DO NOT SWALLOW THE WATER. 2. Please see your family doctor/pediatrician for a history and physical and/or concerning medications. Bring any test results/reports from your physician's office. If you are under the care of a heart doctor or specialist please be aware that you may be asked to see him or her for clearance. 3. You may be asked to stop blood thinners such as Coumadin, Plavix, Fragmin, and Lovenox or Anti-inflammatories such as Aspirin, Ibuprofen, Advil, and Naproxen prior to your surgery. Please check with your doctor before stopping these or any other medications. 4. Do not smoke, and do not drink any alcoholic beverages 24 hours prior to surgery. 5. You MUST make arrangements for a responsible adult to take you home after your surgery. For your safety, you will not be allowed to leave alone or drive yourself home. Your surgery will be cancelled if you do not have a ride home. Also for your safety, it is strongly suggested someone stay with you the first 24 hrs after your surgery. 6. A parent/legal guardian must accompany a child scheduled for surgery and plan to stay at the hospital until the child is discharged. Please do not bring other children with you. 7. For your comfort,please wear simple, loose fitting clothing to the hospital.  Please do not bring valuables (money, credit cards, checkbooks, etc.) Do not wear any makeup (including no eye makeup) or nail polish on your fingers or toes. 8. For your safety, please DO NOT wear any jewelry or piercings on day of surgery. All body piercing jewelry must be removed. 9. If you have dentures, they will be removed before going to the OR; for your convenience we will provide you with a container.   If you wear contact lenses or glasses, they will be removed, they will be removed, please bring a case for them.   10. If appicable,Please see your family doctor/pediatrician for a history & physical and/or concerning medications.  Bring any test results/reports from your physician's office.   11. Remember to bring Blood Bank bracelet to the hospital on the day of surgery.   12. If you have a Living Will and Durable Power of  for Healthcare, please bring in a copy.   13. Notify your Surgeon if you develop any illness between now and surgery  time, cough, cold, fever, sore throat, nausea, vomiting, etc.  Please notify your surgeon if you experience dizziness, shortness of breath or blurred vision between now & the time of your surgery   14. DO NOT shave your operative site 96 hours prior to surgery. For face & neck surgery, men may use an electric razor 48 hours prior to surgery.   15. Shower the night before surgery with _X__Antibacterial soap ___Hibiclens   16. To provide excellent care visitors will be limited to one in the room at any given time.               17.  Please bring picture ID and insurance card.    18.  Visit our web site for additional information:  [email or web address].

## 2023-02-15 ENCOUNTER — ANESTHESIA (OUTPATIENT)
Dept: OPERATING ROOM | Age: 28
End: 2023-02-15
Payer: COMMERCIAL

## 2023-02-15 ENCOUNTER — HOSPITAL ENCOUNTER (OUTPATIENT)
Age: 28
Setting detail: OUTPATIENT SURGERY
Discharge: HOME | End: 2023-02-15
Attending: SURGERY | Admitting: SURGERY
Payer: COMMERCIAL

## 2023-02-15 VITALS
SYSTOLIC BLOOD PRESSURE: 106 MMHG | HEIGHT: 61 IN | OXYGEN SATURATION: 96 % | RESPIRATION RATE: 18 BRPM | HEART RATE: 77 BPM | WEIGHT: 144 LBS | TEMPERATURE: 98.5 F | DIASTOLIC BLOOD PRESSURE: 67 MMHG | BODY MASS INDEX: 27.19 KG/M2

## 2023-02-15 DIAGNOSIS — G89.18 POSTOPERATIVE PAIN: Primary | ICD-10-CM

## 2023-02-15 LAB — PREGNANCY, URINE: NEGATIVE

## 2023-02-15 PROCEDURE — 6370000000 HC RX 637 (ALT 250 FOR IP): Performed by: ANESTHESIOLOGY

## 2023-02-15 PROCEDURE — 84703 CHORIONIC GONADOTROPIN ASSAY: CPT

## 2023-02-15 PROCEDURE — 2500000003 HC RX 250 WO HCPCS: Performed by: SURGERY

## 2023-02-15 PROCEDURE — 6360000002 HC RX W HCPCS: Performed by: SURGERY

## 2023-02-15 PROCEDURE — S2900 ROBOTIC SURGICAL SYSTEM: HCPCS | Performed by: SURGERY

## 2023-02-15 PROCEDURE — 2500000003 HC RX 250 WO HCPCS: Performed by: NURSE ANESTHETIST, CERTIFIED REGISTERED

## 2023-02-15 PROCEDURE — 3600000009 HC SURGERY ROBOT BASE: Performed by: SURGERY

## 2023-02-15 PROCEDURE — 3700000000 HC ANESTHESIA ATTENDED CARE: Performed by: SURGERY

## 2023-02-15 PROCEDURE — 7100000010 HC PHASE II RECOVERY - FIRST 15 MIN: Performed by: SURGERY

## 2023-02-15 PROCEDURE — 7100000001 HC PACU RECOVERY - ADDTL 15 MIN: Performed by: SURGERY

## 2023-02-15 PROCEDURE — 7100000011 HC PHASE II RECOVERY - ADDTL 15 MIN: Performed by: SURGERY

## 2023-02-15 PROCEDURE — 2709999900 HC NON-CHARGEABLE SUPPLY: Performed by: SURGERY

## 2023-02-15 PROCEDURE — 49591 RPR AA HRN 1ST < 3 CM RDC: CPT | Performed by: SURGERY

## 2023-02-15 PROCEDURE — 3700000001 HC ADD 15 MINUTES (ANESTHESIA): Performed by: SURGERY

## 2023-02-15 PROCEDURE — 3600000019 HC SURGERY ROBOT ADDTL 15MIN: Performed by: SURGERY

## 2023-02-15 PROCEDURE — 2500000003 HC RX 250 WO HCPCS: Performed by: ANESTHESIOLOGY

## 2023-02-15 PROCEDURE — 2580000003 HC RX 258: Performed by: ANESTHESIOLOGY

## 2023-02-15 PROCEDURE — 7100000000 HC PACU RECOVERY - FIRST 15 MIN: Performed by: SURGERY

## 2023-02-15 PROCEDURE — 6360000002 HC RX W HCPCS: Performed by: NURSE ANESTHETIST, CERTIFIED REGISTERED

## 2023-02-15 RX ORDER — KETAMINE HCL IN NACL, ISO-OSM 100MG/10ML
SYRINGE (ML) INJECTION PRN
Status: DISCONTINUED | OUTPATIENT
Start: 2023-02-15 | End: 2023-02-15 | Stop reason: SDUPTHER

## 2023-02-15 RX ORDER — KETOROLAC TROMETHAMINE 30 MG/ML
INJECTION, SOLUTION INTRAMUSCULAR; INTRAVENOUS PRN
Status: DISCONTINUED | OUTPATIENT
Start: 2023-02-15 | End: 2023-02-15 | Stop reason: SDUPTHER

## 2023-02-15 RX ORDER — OXYCODONE HYDROCHLORIDE 5 MG/1
10 TABLET ORAL PRN
Status: COMPLETED | OUTPATIENT
Start: 2023-02-15 | End: 2023-02-15

## 2023-02-15 RX ORDER — SODIUM CHLORIDE 0.9 % (FLUSH) 0.9 %
5-40 SYRINGE (ML) INJECTION PRN
Status: DISCONTINUED | OUTPATIENT
Start: 2023-02-15 | End: 2023-02-15 | Stop reason: HOSPADM

## 2023-02-15 RX ORDER — SODIUM CHLORIDE 0.9 % (FLUSH) 0.9 %
5-40 SYRINGE (ML) INJECTION EVERY 12 HOURS SCHEDULED
Status: DISCONTINUED | OUTPATIENT
Start: 2023-02-15 | End: 2023-02-15 | Stop reason: HOSPADM

## 2023-02-15 RX ORDER — MIDAZOLAM HYDROCHLORIDE 5 MG/ML
INJECTION INTRAMUSCULAR; INTRAVENOUS PRN
Status: DISCONTINUED | OUTPATIENT
Start: 2023-02-15 | End: 2023-02-15 | Stop reason: SDUPTHER

## 2023-02-15 RX ORDER — DEXAMETHASONE SODIUM PHOSPHATE 4 MG/ML
INJECTION, SOLUTION INTRA-ARTICULAR; INTRALESIONAL; INTRAMUSCULAR; INTRAVENOUS; SOFT TISSUE PRN
Status: DISCONTINUED | OUTPATIENT
Start: 2023-02-15 | End: 2023-02-15 | Stop reason: SDUPTHER

## 2023-02-15 RX ORDER — CEFAZOLIN SODIUM IN 0.9 % NACL 2 G/100 ML
2000 PLASTIC BAG, INJECTION (ML) INTRAVENOUS
Status: COMPLETED | OUTPATIENT
Start: 2023-02-15 | End: 2023-02-15

## 2023-02-15 RX ORDER — SODIUM CHLORIDE 9 MG/ML
INJECTION, SOLUTION INTRAVENOUS PRN
Status: DISCONTINUED | OUTPATIENT
Start: 2023-02-15 | End: 2023-02-15 | Stop reason: HOSPADM

## 2023-02-15 RX ORDER — BUPIVACAINE HYDROCHLORIDE 5 MG/ML
INJECTION, SOLUTION EPIDURAL; INTRACAUDAL PRN
Status: DISCONTINUED | OUTPATIENT
Start: 2023-02-15 | End: 2023-02-15 | Stop reason: ALTCHOICE

## 2023-02-15 RX ORDER — ROCURONIUM BROMIDE 10 MG/ML
INJECTION, SOLUTION INTRAVENOUS PRN
Status: DISCONTINUED | OUTPATIENT
Start: 2023-02-15 | End: 2023-02-15 | Stop reason: SDUPTHER

## 2023-02-15 RX ORDER — ONDANSETRON 2 MG/ML
4 INJECTION INTRAMUSCULAR; INTRAVENOUS
Status: DISCONTINUED | OUTPATIENT
Start: 2023-02-15 | End: 2023-02-15 | Stop reason: HOSPADM

## 2023-02-15 RX ORDER — FAMOTIDINE 10 MG/ML
20 INJECTION, SOLUTION INTRAVENOUS ONCE
Status: COMPLETED | OUTPATIENT
Start: 2023-02-15 | End: 2023-02-15

## 2023-02-15 RX ORDER — PROPOFOL 10 MG/ML
INJECTION, EMULSION INTRAVENOUS PRN
Status: DISCONTINUED | OUTPATIENT
Start: 2023-02-15 | End: 2023-02-15 | Stop reason: SDUPTHER

## 2023-02-15 RX ORDER — SODIUM CHLORIDE, SODIUM LACTATE, POTASSIUM CHLORIDE, CALCIUM CHLORIDE 600; 310; 30; 20 MG/100ML; MG/100ML; MG/100ML; MG/100ML
INJECTION, SOLUTION INTRAVENOUS CONTINUOUS
Status: DISCONTINUED | OUTPATIENT
Start: 2023-02-15 | End: 2023-02-15 | Stop reason: HOSPADM

## 2023-02-15 RX ORDER — ESMOLOL HYDROCHLORIDE 10 MG/ML
INJECTION INTRAVENOUS PRN
Status: DISCONTINUED | OUTPATIENT
Start: 2023-02-15 | End: 2023-02-15 | Stop reason: SDUPTHER

## 2023-02-15 RX ORDER — FENTANYL CITRATE 50 UG/ML
INJECTION, SOLUTION INTRAMUSCULAR; INTRAVENOUS PRN
Status: DISCONTINUED | OUTPATIENT
Start: 2023-02-15 | End: 2023-02-15 | Stop reason: SDUPTHER

## 2023-02-15 RX ORDER — SODIUM CHLORIDE 9 MG/ML
25 INJECTION, SOLUTION INTRAVENOUS PRN
Status: DISCONTINUED | OUTPATIENT
Start: 2023-02-15 | End: 2023-02-15 | Stop reason: HOSPADM

## 2023-02-15 RX ORDER — OXYCODONE HYDROCHLORIDE 5 MG/1
5 TABLET ORAL PRN
Status: COMPLETED | OUTPATIENT
Start: 2023-02-15 | End: 2023-02-15

## 2023-02-15 RX ORDER — OXYCODONE HYDROCHLORIDE AND ACETAMINOPHEN 5; 325 MG/1; MG/1
1 TABLET ORAL EVERY 4 HOURS PRN
Qty: 10 TABLET | Refills: 0 | Status: SHIPPED | OUTPATIENT
Start: 2023-02-15 | End: 2023-02-18

## 2023-02-15 RX ORDER — LIDOCAINE HYDROCHLORIDE 20 MG/ML
INJECTION, SOLUTION INFILTRATION; PERINEURAL PRN
Status: DISCONTINUED | OUTPATIENT
Start: 2023-02-15 | End: 2023-02-15 | Stop reason: SDUPTHER

## 2023-02-15 RX ORDER — ONDANSETRON 2 MG/ML
INJECTION INTRAMUSCULAR; INTRAVENOUS PRN
Status: DISCONTINUED | OUTPATIENT
Start: 2023-02-15 | End: 2023-02-15 | Stop reason: SDUPTHER

## 2023-02-15 RX ORDER — MEPERIDINE HYDROCHLORIDE 50 MG/ML
12.5 INJECTION INTRAMUSCULAR; INTRAVENOUS; SUBCUTANEOUS EVERY 5 MIN PRN
Status: DISCONTINUED | OUTPATIENT
Start: 2023-02-15 | End: 2023-02-15 | Stop reason: HOSPADM

## 2023-02-15 RX ADMIN — FENTANYL CITRATE 75 MCG: 50 INJECTION INTRAMUSCULAR; INTRAVENOUS at 10:33

## 2023-02-15 RX ADMIN — LIDOCAINE HYDROCHLORIDE 100 MG: 20 INJECTION, SOLUTION INFILTRATION; PERINEURAL at 10:19

## 2023-02-15 RX ADMIN — PROPOFOL 200 MG: 10 INJECTION, EMULSION INTRAVENOUS at 10:19

## 2023-02-15 RX ADMIN — ESMOLOL HYDROCHLORIDE 10 MG: 10 INJECTION, SOLUTION INTRAVENOUS at 10:29

## 2023-02-15 RX ADMIN — SODIUM CHLORIDE, SODIUM LACTATE, POTASSIUM CHLORIDE, AND CALCIUM CHLORIDE: .6; .31; .03; .02 INJECTION, SOLUTION INTRAVENOUS at 09:10

## 2023-02-15 RX ADMIN — Medication 20 MG: at 10:32

## 2023-02-15 RX ADMIN — OXYCODONE HYDROCHLORIDE 5 MG: 5 TABLET ORAL at 13:14

## 2023-02-15 RX ADMIN — FAMOTIDINE 20 MG: 10 INJECTION, SOLUTION INTRAVENOUS at 10:01

## 2023-02-15 RX ADMIN — MIDAZOLAM HYDROCHLORIDE 2 MG: 5 INJECTION, SOLUTION INTRAMUSCULAR; INTRAVENOUS at 10:10

## 2023-02-15 RX ADMIN — DEXAMETHASONE SODIUM PHOSPHATE 8 MG: 4 INJECTION, SOLUTION INTRAMUSCULAR; INTRAVENOUS at 10:39

## 2023-02-15 RX ADMIN — ONDANSETRON 4 MG: 2 INJECTION INTRAMUSCULAR; INTRAVENOUS at 10:49

## 2023-02-15 RX ADMIN — SUGAMMADEX 200 MG: 100 INJECTION, SOLUTION INTRAVENOUS at 10:49

## 2023-02-15 RX ADMIN — KETOROLAC TROMETHAMINE 30 MG: 30 INJECTION, SOLUTION INTRAMUSCULAR; INTRAVENOUS at 10:53

## 2023-02-15 RX ADMIN — FENTANYL CITRATE 25 MCG: 50 INJECTION INTRAMUSCULAR; INTRAVENOUS at 10:19

## 2023-02-15 RX ADMIN — ROCURONIUM BROMIDE 50 MG: 10 SOLUTION INTRAVENOUS at 10:19

## 2023-02-15 RX ADMIN — ESMOLOL HYDROCHLORIDE 10 MG: 10 INJECTION, SOLUTION INTRAVENOUS at 10:21

## 2023-02-15 RX ADMIN — Medication 2000 MG: at 10:09

## 2023-02-15 ASSESSMENT — PAIN - FUNCTIONAL ASSESSMENT
PAIN_FUNCTIONAL_ASSESSMENT: 0-10
PAIN_FUNCTIONAL_ASSESSMENT: PREVENTS OR INTERFERES SOME ACTIVE ACTIVITIES AND ADLS

## 2023-02-15 ASSESSMENT — PAIN SCALES - GENERAL
PAINLEVEL_OUTOF10: 4
PAINLEVEL_OUTOF10: 0

## 2023-02-15 ASSESSMENT — ENCOUNTER SYMPTOMS: SHORTNESS OF BREATH: 0

## 2023-02-15 ASSESSMENT — PAIN DESCRIPTION - ORIENTATION: ORIENTATION: MID

## 2023-02-15 ASSESSMENT — PAIN DESCRIPTION - DESCRIPTORS: DESCRIPTORS: ACHING

## 2023-02-15 ASSESSMENT — LIFESTYLE VARIABLES: SMOKING_STATUS: 0

## 2023-02-15 ASSESSMENT — PAIN DESCRIPTION - LOCATION: LOCATION: ABDOMEN

## 2023-02-15 NOTE — ANESTHESIA POSTPROCEDURE EVALUATION
Department of Anesthesiology  Postprocedure Note    Patient: Tim Manuel  MRN: [id number]  YOB: 1995  Date of evaluation: 2/15/2023      Procedure Summary     Date: 02/15/23 Room / Location: [facility] OR [address] [place] [address]    Anesthesia Start: 8467 Anesthesia Stop: 1107    Procedure: OPEN UMBILICAL HERNIA REPAIR WITH MESH, (Abdomen) Diagnosis:       Umbilical hernia without obstruction and without gangrene      (UMBILICAL HERNIA)    Surgeons: Paloma Simmons MD Responsible Provider: Abran Ramirez MD    Anesthesia Type: general ASA Status: 2          Anesthesia Type: No value filed.     Shankar Phase I: Shankar Score: 10    Shankar Phase II: Shankar Score: 10  Vitals:    02/15/23 1200 02/15/23 1205 02/15/23 1210 02/15/23 1224   BP: 122/73 119/78 128/80 106/67   Pulse: 76 78 78 77   Resp: 14 14 14 12   Temp:    98.5 °F (36.9 °C)   TempSrc:    Temporal   SpO2: 98% 97% 96% 96%   Weight:       Height:           Anesthesia Post Evaluation    Patient location during evaluation: bedside  Patient participation: complete - patient participated  Level of consciousness: awake and alert  Airway patency: patent  Nausea & Vomiting: no nausea  Complications: no  Cardiovascular status: hemodynamically stable  Respiratory status: acceptable  Hydration status: euvolemic

## 2023-02-15 NOTE — PROGRESS NOTES
Patient admitted to [place] [address] in preparation for surgery, VSS. Consent confirmed. IV inserted into left wrist by Dr. Melissa Vazquez LR infusing. Belongings on cart to PACU. NPO since 2200.

## 2023-02-15 NOTE — PROGRESS NOTES
D: Patient here from OR via bed, taken to bay 3 in PACU, all current drips, treatments, skin issues, and plan of care were reviewed by both RN's, patient transferred in stable condition, patient has an oral airway but awaked on her own, patient is alert x 4 and follows commands, call light is in reach. A: Assessment completed and documented, discussed plan of care with patient who agreed. Cimzia Pregnancy And Lactation Text: This medication crosses the placenta but can be considered safe in certain situations. Cimzia may be excreted in breast milk.

## 2023-02-15 NOTE — PROGRESS NOTES
Pt up to the bathroom to void without difficulty. Shankar score 10 meeting d/c criteria. Discharge instructions given to pt and family. Verbalized understanding. PIV removed. Pt dressed and wheeled out and discharged to the care of their family in stable condition.

## 2023-02-15 NOTE — DISCHARGE INSTRUCTIONS
[facility] SURGERY [facility] AND [place]    Yulissa Groves. Ankur Beltran M.D. [address] [facility] [address] [address] [place]                [address] Kike Adrian M.D. [address] [place] [address], [address]         [place], [address] [facility] German Beckford M.D                         [phone]          [facility] Phuong RAUL Curiel M.D. [facility]                                                        POST-OPERATIVE INSTRUCTIONS HERNIA REPAIR    Call the office to schedule your post-operative appointment with your surgeon for two (2) weeks. If you still have bandages over your incisions, you  may remove them in 2-3 days. Place an ice pack over your incisions on and off (15min) at a time for the next 2 days. General guidelines for activity:      Avoid strenuous activity or lifting anything heavier than 15 pounds. It is OK to be up and walking around. Going up and down stairs is   also OK. Do what is comfortable: stop and rest when you feel tired. You will have pain medicine ordered. Take as directed. Do NOT drive for one week and while taking your narcotic pain medicine. Watch for signs of infection:    Excessive warmth or bright redness around your incisions    Leakage of bloody or cloudy fluid from you incisions    Fever over 100.5    If you experience constipation  Increase your water intake. Increase your activity; walking is best.  A stool softener or mild laxative may be necessary if you still have not had a bowel  movement ; call the office for further instructions. Please take note: IF you do not take all of your narcotic pain medication, we ask that you dispose of these responsibly.    Drug drop off boxes are located in the [facility] and [facility] emergency departments. These [facility] Safe return cabinets are available 24/7 in the emergency department [facility] office staff may NOT accept any medications to drop off in the cabin  [address] are under the influence of drugs- do not drink alcohol, drive a car, operate machinery(such as power tools, kitchen appliances, etc), sign legal documents, or make any important decisions for 24 hours (or while on pain medications). Children should not ride bikes or [place] or play on gym sets  for 24 hours after surgery. A responsible adult should be with you for 24 hours. Rest at home today- increase activity as tolerated. Progress slowly to a regular diet unless your physician has instructed you otherwise. Drink plenty of water. CALL YOUR DOCTOR IF YOU:  Have moderate to severe nausea or vomiting AND are unable to hold down fluids or prescribed medications. Have bright red bloody drainage from your dressing that won't stop oozing. Do not get relief with your pain medication    NORMAL (POSSIBLE) SIDE EFFECTS FROM ANESTHESIA:     Confusion, temporary memory loss, delayed reaction times in the first 24 hours  Lightheadedness, dizziness, difficulty focusing, blurred vision  Nausea/vomiting can happen  Shivering, feeling cold, sore throat, cough and muscle aches should stop within 24-48 hours  Trouble urinating - call your surgeon if it has been more than 8 hrs  Bruising or soreness at the IV site - call if it remains red, firm or there is drainage             FEMALES OF CHILDBEARING AGE WHO ARE TAKING BIRTH CONTROL PILLS:  You may have received a medication during your procedure that interferes with the   actions of birth control pills (Bridion or Emend). Use some other kind of birth control in addition to your pills, like a condom, for 1 month after your procedure to prevent unwanted pregnancy.     The following instructions are to be followed if you have a known history or diagnosis of sleep apnea: For all sleep apnea patients:  ? Sleep on your side or sitting up in a chair whenever possible, especially the first 24 hours after surgery. ? Use only medicines prescribed by your doctor. ? Do not drink alcohol. ? If you have a dental device to assist you while at rest, use it at all times for the first 24 hours. For patients using CPAP machines:  ? Use your CPAP machine during all periods of sleep as usual.  ? Use your CPAP machine during all periods of daytime rest while on pain medicines. ** Follow up with your primary care doctor for continued care. IF YOU DO NOT TAKE ALL OF YOUR NARCOTIC PAIN MEDICATION, please dispose of them responsibly. There are drop off boxes in the Emergency Departments 24/7 at both [facility] and [facility]. If these locations are not convenient, other options for discarding them can be found at:  [email or web address]    Hospital or office staff may NOT accept any medications to drop off in the cabinet for you.

## 2023-02-15 NOTE — H&P
I have reviewed the history and physical (in Media tab) and examined the patient. On reassessment, this hernia is very small, possibly less than 1cm in diameter. As such, I will plan to switch from a robotic to open approach with likely simple primary repair or possible additional mesh reinforcement. I have reviewed with the patient and/or family members, during the preoperative office visit the risks, benefits, and alternatives to the procedure.     Tao Butts MD

## 2023-02-15 NOTE — ANESTHESIA PRE PROCEDURE
Department of Anesthesiology  Preprocedure Note       Name:  Denton Benitez   Age:  32 y.o.  :  1995                                          MRN:  [id number]         Date:  2/15/2023      Surgeon: Ramiro Kay):  John Rico MD    Procedure: Procedure(s):  ROBOTIC UMBILICAL HERNIA REPAIR WITH MESH, POSSIBLE OPEN PROCEDURE    Medications prior to admission:   Prior to Admission medications    Medication Sig Start Date End Date Taking?  Authorizing Provider   diphenhydrAMINE-APAP, sleep, (TYLENOL PM EXTRA STRENGTH)  MG tablet Take 1 tablet by mouth nightly as needed for Sleep   Yes Historical Provider, MD   acetaminophen (TYLENOL) 500 MG tablet Take 500 mg by mouth every 6 hours as needed for Pain   Yes Historical Provider, MD   pantoprazole (PROTONIX) 20 MG tablet TAKE 1 TABLET BY MOUTH EVERY DAY BEFORE BREAKFAST  Patient not taking: Reported on 2/15/2023 1/27/23   Collin Epps DO   polyethylene glycol (GLYCOLAX) 17 GM/SCOOP powder Take 17 g by mouth daily    Historical Provider, MD   ondansetron (ZOFRAN) 4 MG tablet Take 1 tablet by mouth every 8 hours as needed for Nausea 1/3/23   Meron Manuel, APRN - CNP   levonorgestrel (MIRENA) IUD 52 mg 1 each by IntraUTERine route 3/30/18   Historical Provider, MD   Multiple Vitamin (MULTIVITAMIN ADULT PO) Take 1 capsule by mouth daily    Historical Provider, MD       Current medications:    Current Facility-Administered Medications   Medication Dose Route Frequency Provider Last Rate Last Admin    famotidine (PEPCID) injection 20 mg  20 mg IntraVENous Once Landen Cleary MD        lactated ringers IV soln infusion   IntraVENous Continuous Landen Cleary MD        sodium chloride flush 0.9 % injection 5-40 mL  5-40 mL IntraVENous 2 times per day Landen Cleary MD        sodium chloride flush 0.9 % injection 5-40 mL  5-40 mL IntraVENous PRN Landen Cleary MD        0.9 % sodium chloride infusion IntraVENous PRN Sara Merritt MD        sodium chloride flush 0.9 % injection 5-40 mL  5-40 mL IntraVENous 2 times per day Jemima Moore MD        sodium chloride flush 0.9 % injection 5-40 mL  5-40 mL IntraVENous PRN Jemima Moore MD        0.9 % sodium chloride infusion   IntraVENous PRN Jemima Moore MD        ceFAZolin (ANCEF) 2000 mg in 0.9% sodium chloride 100 mL IVPB  2,000 mg IntraVENous On Call to [address] MD           Allergies:  No Known Allergies    Problem List:    Patient Active Problem List   Diagnosis Code    Tachycardia R00.0    Palpitations R00.2    Anxiety F41.9    IUD (intrauterine device) in place Z97.5    Idiopathic scoliosis M41.20    Bilateral ocular hypertension H40.053    Blurred vision, bilateral Q42.8    Umbilical hernia without obstruction and without gangrene K42.9       Past Medical History:        Diagnosis Date    Anxiety     Anxiety     Scoliosis        Past Surgical History:        Procedure Laterality Date    UPPER GASTROINTESTINAL ENDOSCOPY N/A 9/4/2022    EGD DIAGNOSTIC ONLY performed by Virgie Renae DO at [address],  [facility] History:    Social History     Tobacco Use    Smoking status: Never    Smokeless tobacco: Never   Substance Use Topics    Alcohol use: Not Currently                                Counseling given: Not Answered      Vital Signs (Current):   Vitals:    02/10/23 1151 02/15/23 0815   BP:  127/84   Pulse:  (!) 104   Resp:  16   Temp:  97.9 °F (36.6 °C)   TempSrc:  Temporal   SpO2:  100%   Weight: 143 lb (64.9 kg) 144 lb (65.3 kg)   Height:  5' 1\" (1.549 m)                                              BP Readings from Last 3 Encounters:   02/15/23 127/84   01/10/23 119/85   01/03/23 126/88       NPO Status: Time of last liquid consumption: 1130                        Time of last solid consumption: 2200                        Date of last liquid consumption: 02/14/23 Date of last solid food consumption: 02/14/23    BMI:   Wt Readings from Last 3 Encounters:   02/15/23 144 lb (65.3 kg)   01/10/23 141 lb 9.6 oz (64.2 kg)   09/03/22 140 lb (63.5 kg)     Body mass index is 27.21 kg/m². CBC:   Lab Results   Component Value Date/Time    WBC 11.2 01/03/2023 02:28 PM    RBC 5.05 01/03/2023 02:28 PM    HGB 15.3 01/03/2023 02:28 PM    HCT 45.6 01/03/2023 02:28 PM    MCV 90.2 01/03/2023 02:28 PM    RDW 12.7 01/03/2023 02:28 PM     01/03/2023 02:28 PM       CMP:   Lab Results   Component Value Date/Time     01/03/2023 02:28 PM    K 5.0 01/03/2023 02:28 PM     01/03/2023 02:28 PM    CO2 22 01/03/2023 02:28 PM    BUN 9 01/03/2023 02:28 PM    CREATININE 0.6 01/03/2023 02:28 PM    GFRAA >60 01/06/2022 11:29 AM    AGRATIO 1.5 01/03/2023 02:28 PM    LABGLOM >60 01/03/2023 02:28 PM    GLUCOSE 107 01/03/2023 02:28 PM    PROT 7.5 01/03/2023 02:28 PM    CALCIUM 9.4 01/03/2023 02:28 PM    BILITOT 0.4 01/03/2023 02:28 PM    ALKPHOS 27 01/03/2023 02:28 PM    AST 41 01/03/2023 02:28 PM    ALT 30 01/03/2023 02:28 PM       POC Tests: No results for input(s): POCGLU, POCNA, POCK, POCCL, POCBUN, POCHEMO, POCHCT in the last 72 hours.     Coags: No results found for: PROTIME, INR, APTT    HCG (If Applicable):   Lab Results   Component Value Date    PREGTESTUR Negative 02/15/2023        ABGs: No results found for: PHART, PO2ART, YJA8AYU, IGY4RRE, BEART, L6MCATYX     Type & Screen (If Applicable):  No results found for: LABABO, LABRH    Drug/Infectious Status (If Applicable):  No results found for: HIV, HEPCAB    COVID-19 Screening (If Applicable): No results found for: COVID19        Anesthesia Evaluation  Patient summary reviewed no history of anesthetic complications:   Airway: Mallampati: III  TM distance: <3 FB   Neck ROM: full  Mouth opening: > = 3 FB   Dental:          Pulmonary:Negative Pulmonary ROS breath sounds clear to auscultation      (-) COPD, shortness of breath, recent URI, sleep apnea and not a current smoker          Patient did not smoke on day of surgery. Cardiovascular:    (+) hypertension (W/ PREGNANCY, NO MEDS OR ISSUES NOW):,     (-) pacemaker, past MI, CAD, CABG/stent, dysrhythmias,  angina and  CHF    ECG reviewed  Rhythm: regular  Rate: normal  Echocardiogram reviewed         Beta Blocker:  Not on Beta Blocker         Neuro/Psych:   (+) neuromuscular disease (SCOLIOSIS):, depression/anxiety  (ANXIETY)   (-) seizures, TIA and CVA           GI/Hepatic/Renal:   (+) GERD: well controlled,      (-) hiatal hernia, liver disease, no renal disease, bowel prep and no morbid obesity       Endo/Other:    (+) no malignancy/cancer. (-) diabetes mellitus, hypothyroidism, hyperthyroidism, blood dyscrasia, no malignancy/cancer               Abdominal:       Abdomen: soft. Vascular: negative vascular ROS. Other Findings:           Anesthesia Plan      general     ASA 2       Induction: intravenous. MIPS: Postoperative opioids intended and Prophylactic antiemetics administered. Anesthetic plan and risks discussed with patient. Plan discussed with CRNA. This pre-anesthesia assessment may be used as a history and physical.    DOS STAFF ADDENDUM:    Pt seen and examined, chart reviewed (including anesthesia, drug and allergy history). No interval changes to history and physical examination. Anesthetic plan, risks, benefits, alternatives, and personnel involved discussed with patient. Patient verbalized an understanding and agrees to proceed.       Ric Stratton MD  February 15, 2023  8:46 AM    Ric Stratton MD   2/15/2023

## 2023-02-15 NOTE — OP NOTE
Operative Note      Patient: Dane Rider  YOB: 1995  MRN: [id number]    Date of Procedure: 2/15/2023    Pre-Op Diagnosis: UMBILICAL HERNIA    Post-Op Diagnosis: Same       Procedure(s):  OPEN UMBILICAL HERNIA REPAIR WITH MESH,    Surgeon(s):  Gilbert Macias MD    Assistant:   Surgical Assistant: Thalia Guo    Anesthesia: General    Estimated Blood Loss (mL): less than 50     Complications: None    Specimens:   * No specimens in log *    Implants:  * No implants in log *      Drains: * No LDAs found *    Findings: 1-60TO umbilical fascial defect, non-incarcerated                  Primary closure of defect      Detailed Description of Procedure:   After informed consent was obtained, patient was taken to Operating Room and placed in the supine position. Preoperative antibiotics were initiated in the Holding Area. General anesthesia was administered without difficulty. The abdomen was prepped and draped in a sterile fashion. Local anesthesia was infiltrated circumferentially around the umbilicus. An infraumbilical curvilinear incision was created sharply and dissection carried down to the midline fascia. The fascial defect with protruding preperitoneal fat was easily identified. I cleared off the edges of the defect sharply. The defect was small, approximately 8x10mm, so I elected to close it primarily. Interrupted horizontal mattress 0 Ethibond sutures were placed across the defect to close it in a transverse orientation. Hemostasis was excellent. The skin incision was closed in layers, and Dermabond applied to the skin. Patient was extubated and taken to the Recovery Room in stable condition.     Electronically signed by Eleanor Scruggs MD on 2/15/2023 at 10:48 AM

## 2023-03-03 ENCOUNTER — OFFICE VISIT (OUTPATIENT)
Dept: SURGERY | Age: 28
End: 2023-03-03

## 2023-03-03 VITALS
SYSTOLIC BLOOD PRESSURE: 119 MMHG | BODY MASS INDEX: 26.96 KG/M2 | WEIGHT: 142.8 LBS | DIASTOLIC BLOOD PRESSURE: 76 MMHG | HEART RATE: 98 BPM | HEIGHT: 61 IN

## 2023-03-03 DIAGNOSIS — Z09 POSTOP CHECK: Primary | ICD-10-CM

## 2023-03-03 NOTE — PROGRESS NOTES
Surgery Post-op Progress Note    HPI:  Notes reviewed, and agree with documentation in pt's chart. Postoperative Follow-up: Patient presents for 2 week follow-up status post primary umbilical herniorrhaphy . Eating a regular diet without difficulty. Bowel movements are Normal.  Pain is controlled without any medications. .     ROS:    10 point review of systems performed; please refer to HPI with pertinent positives, all other ROS are negative    A review of the patient's record including allergies, medication list, tobacco history, family history, problem list, medical history and social history has been completed and updates made to the patient's EMR where indicated. PE:   CONSTITUTIONAL:  awake and alert    ABDOMEN: soft, non-distended, non-tender     INCISION: clean, dry, healing, mild ecchymosis below umbilicus      ASSESSMENT:   Diagnosis Orders   1.  Postop check        Doing well overall      PLAN:    Continue with routine wound care as discussed  Gradually increase activities as tolerated  Follow-up as needed; please call with questions or concerns

## 2023-04-11 NOTE — PROGRESS NOTES
Transferred to Phase II care. Vital signs stable. Continues to deny pain. Incision site well approximated without redness or warmth. Taking water and crackers at this time. spontaneous/unlabored

## 2024-01-02 ENCOUNTER — TELEPHONE (OUTPATIENT)
Age: 29
End: 2024-01-02
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
## 2024-04-02 ENCOUNTER — TELEPHONE (OUTPATIENT)
Age: 29
End: 2024-04-02

## 2024-04-02 NOTE — TELEPHONE ENCOUNTER
Pt cancelled npt appt for tremors for 4/3/24. Pt stated she started new job. Pt stated she would cb to reschedule.

## 2024-07-18 ENCOUNTER — HOSPITAL ENCOUNTER (EMERGENCY)
Age: 29
Discharge: HOME | End: 2024-07-19
Attending: EMERGENCY MEDICINE
Payer: COMMERCIAL

## 2024-07-18 DIAGNOSIS — T74.21XA SEXUAL ASSAULT OF ADULT, INITIAL ENCOUNTER: Primary | ICD-10-CM

## 2024-07-18 LAB
AMORPH SED URNS QL MICRO: ABNORMAL /HPF
ANION GAP SERPL CALCULATED.3IONS-SCNC: 17 MMOL/L (ref 3–16)
BACTERIA URNS QL MICRO: ABNORMAL /HPF
BASOPHILS # BLD: 0.1 K/UL (ref 0–0.2)
BASOPHILS NFR BLD: 0.7 %
BILIRUB UR QL STRIP.AUTO: ABNORMAL
BUN SERPL-MCNC: 8 MG/DL (ref 7–20)
CALCIUM SERPL-MCNC: 9.1 MG/DL (ref 8.3–10.6)
CHLORIDE SERPL-SCNC: 103 MMOL/L (ref 99–110)
CLARITY UR: CLEAR
CO2 SERPL-SCNC: 18 MMOL/L (ref 21–32)
COLOR UR: YELLOW
CREAT SERPL-MCNC: 0.6 MG/DL (ref 0.6–1.1)
DEPRECATED RDW RBC AUTO: 12.6 % (ref 12.4–15.4)
EOSINOPHIL # BLD: 0 K/UL (ref 0–0.6)
EOSINOPHIL NFR BLD: 0.1 %
EPI CELLS #/AREA URNS HPF: ABNORMAL /HPF (ref 0–5)
GFR SERPLBLD CREATININE-BSD FMLA CKD-EPI: >90 ML/MIN/{1.73_M2}
GLUCOSE SERPL-MCNC: 93 MG/DL (ref 70–99)
GLUCOSE UR STRIP.AUTO-MCNC: NEGATIVE MG/DL
HCT VFR BLD AUTO: 46.7 % (ref 36–48)
HGB BLD-MCNC: 15.9 G/DL (ref 12–16)
HGB UR QL STRIP.AUTO: NEGATIVE
KETONES UR STRIP.AUTO-MCNC: >=80 MG/DL
LACTATE BLDV-SCNC: 0.9 MMOL/L (ref 0.4–1.9)
LEUKOCYTE ESTERASE UR QL STRIP.AUTO: NEGATIVE
LYMPHOCYTES # BLD: 1.4 K/UL (ref 1–5.1)
LYMPHOCYTES NFR BLD: 13.1 %
MCH RBC QN AUTO: 30.9 PG (ref 26–34)
MCHC RBC AUTO-ENTMCNC: 34.1 G/DL (ref 31–36)
MCV RBC AUTO: 90.8 FL (ref 80–100)
MONOCYTES # BLD: 0.7 K/UL (ref 0–1.3)
MONOCYTES NFR BLD: 6.7 %
MUCOUS THREADS #/AREA URNS LPF: ABNORMAL /LPF
NEUTROPHILS # BLD: 8.5 K/UL (ref 1.7–7.7)
NEUTROPHILS NFR BLD: 79.4 %
NITRITE UR QL STRIP.AUTO: NEGATIVE
PH UR STRIP.AUTO: 6 [PH] (ref 5–8)
PLATELET # BLD AUTO: 276 K/UL (ref 135–450)
PMV BLD AUTO: 8.3 FL (ref 5–10.5)
POTASSIUM SERPL-SCNC: 3.8 MMOL/L (ref 3.5–5.1)
PROT UR STRIP.AUTO-MCNC: ABNORMAL MG/DL
RBC # BLD AUTO: 5.14 M/UL (ref 4–5.2)
RBC #/AREA URNS HPF: ABNORMAL /HPF (ref 0–4)
SODIUM SERPL-SCNC: 138 MMOL/L (ref 136–145)
SP GR UR STRIP.AUTO: >=1.03 (ref 1–1.03)
UA COMPLETE W REFLEX CULTURE PNL UR: ABNORMAL
UA DIPSTICK W REFLEX MICRO PNL UR: YES
URN SPEC COLLECT METH UR: ABNORMAL
UROBILINOGEN UR STRIP-ACNC: 0.2 E.U./DL
WBC # BLD AUTO: 10.6 K/UL (ref 4–11)
WBC #/AREA URNS HPF: ABNORMAL /HPF (ref 0–5)

## 2024-07-18 PROCEDURE — 84703 CHORIONIC GONADOTROPIN ASSAY: CPT

## 2024-07-18 PROCEDURE — 80048 BASIC METABOLIC PNL TOTAL CA: CPT

## 2024-07-18 PROCEDURE — 83605 ASSAY OF LACTIC ACID: CPT

## 2024-07-18 PROCEDURE — 85025 COMPLETE CBC W/AUTO DIFF WBC: CPT

## 2024-07-18 PROCEDURE — 81001 URINALYSIS AUTO W/SCOPE: CPT

## 2024-07-18 PROCEDURE — 2580000003 HC RX 258: Performed by: EMERGENCY MEDICINE

## 2024-07-18 PROCEDURE — 99284 EMERGENCY DEPT VISIT MOD MDM: CPT

## 2024-07-18 RX ORDER — 0.9 % SODIUM CHLORIDE 0.9 %
1000 INTRAVENOUS SOLUTION INTRAVENOUS ONCE
Status: COMPLETED | OUTPATIENT
Start: 2024-07-18 | End: 2024-07-18

## 2024-07-18 RX ORDER — DOXYCYCLINE 100 MG/1
100 TABLET ORAL 2 TIMES DAILY
Qty: 14 TABLET | Refills: 0 | Status: SHIPPED | OUTPATIENT
Start: 2024-07-18 | End: 2024-07-25

## 2024-07-18 RX ORDER — CEFTRIAXONE 1 G/1
500 INJECTION, POWDER, FOR SOLUTION INTRAMUSCULAR; INTRAVENOUS ONCE
Status: COMPLETED | OUTPATIENT
Start: 2024-07-19 | End: 2024-07-19

## 2024-07-18 RX ORDER — METRONIDAZOLE 500 MG/1
500 TABLET ORAL 2 TIMES DAILY
Qty: 14 TABLET | Refills: 0 | Status: SHIPPED | OUTPATIENT
Start: 2024-07-18 | End: 2024-07-25

## 2024-07-18 RX ADMIN — SODIUM CHLORIDE 1000 ML: 9 INJECTION, SOLUTION INTRAVENOUS at 19:17

## 2024-07-18 ASSESSMENT — ENCOUNTER SYMPTOMS
RHINORRHEA: 0
ABDOMINAL PAIN: 0
SHORTNESS OF BREATH: 0
SORE THROAT: 0
EYE REDNESS: 0

## 2024-07-18 ASSESSMENT — PAIN SCALES - GENERAL: PAINLEVEL_OUTOF10: 3

## 2024-07-18 ASSESSMENT — PAIN - FUNCTIONAL ASSESSMENT: PAIN_FUNCTIONAL_ASSESSMENT: 0-10

## 2024-07-18 ASSESSMENT — LIFESTYLE VARIABLES
HOW MANY STANDARD DRINKS CONTAINING ALCOHOL DO YOU HAVE ON A TYPICAL DAY: 1 OR 2
HOW OFTEN DO YOU HAVE A DRINK CONTAINING ALCOHOL: MONTHLY OR LESS

## 2024-07-18 NOTE — ED NOTES
[facility] left another voicemail with call back information.
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
## 2024-07-19 ENCOUNTER — ANCILLARY PROCEDURE (OUTPATIENT)
Dept: EMERGENCY DEPT | Age: 29
End: 2024-07-19
Attending: EMERGENCY MEDICINE
Payer: COMMERCIAL

## 2024-07-19 VITALS
WEIGHT: 139 LBS | HEIGHT: 61 IN | OXYGEN SATURATION: 99 % | TEMPERATURE: 97.8 F | DIASTOLIC BLOOD PRESSURE: 79 MMHG | SYSTOLIC BLOOD PRESSURE: 118 MMHG | RESPIRATION RATE: 16 BRPM | HEART RATE: 81 BPM | BODY MASS INDEX: 26.24 KG/M2

## 2024-07-19 LAB — HCG UR QL: NEGATIVE

## 2024-07-19 PROCEDURE — 76857 US EXAM PELVIC LIMITED: CPT

## 2024-07-19 PROCEDURE — 6360000002 HC RX W HCPCS: Performed by: EMERGENCY MEDICINE

## 2024-07-19 PROCEDURE — 96372 THER/PROPH/DIAG INJ SC/IM: CPT

## 2024-07-19 RX ADMIN — CEFTRIAXONE SODIUM 500 MG: 1 INJECTION, POWDER, FOR SOLUTION INTRAMUSCULAR; INTRAVENOUS at 00:09

## 2024-07-19 NOTE — DISCHARGE INSTRUCTIONS
Some important things to remember that we wanted to remind you of is:    You should abstain from sex for 7 days after you finish treatment    You should get follow-up testing for STDs within 2-4 weeks.    If you do not have a doctor, here are some clinics you can use to get further testing:    [facility]                            Critical access hospital                        [address], [place]  [address] [phone]    [facility]                            Critical access hospital                        [address], [place]  [address] [phone]    [facility]                           [facility]  [address], [place] [address] [phone]    [address]  [address] [phone]    [place] AIDS Hotline  [phone]

## (undated) DEVICE — ENDO CARRY-ON PROCEDURE KIT INCLUDES SUCTION TUBING, LUBRICANT, GAUZE, BIOHAZARD STICKER, TRANSPORT PAD AND INTERCEPT BEDSIDE KIT.: Brand: ENDO CARRY-ON PROCEDURE KIT

## (undated) DEVICE — CONMED SCOPE SAVER BITE BLOCK, 20X27 MM: Brand: SCOPE SAVER

## (undated) DEVICE — SUTURE VCRL + SZ 3-0 L18IN ABSRB UD SH 1/2 CIR TAPERCUT NDL VCP864D

## (undated) DEVICE — MINOR SET UP: Brand: MEDLINE INDUSTRIES, INC.

## (undated) DEVICE — CHLORAPREP 26ML ORANGE

## (undated) DEVICE — GLOVE,SURG,SENSICARE SLT,LF,PF,7.5: Brand: MEDLINE

## (undated) DEVICE — SUTURE ETHBND EXCEL SZ 0 L18IN NONABSORBABLE GRN L22MM MO-7 CX41D

## (undated) DEVICE — SOLUTION IRRIG 2000ML STRL H2O UROMATIC PLAS CONT USP